# Patient Record
Sex: FEMALE | Race: BLACK OR AFRICAN AMERICAN | Employment: UNEMPLOYED | URBAN - METROPOLITAN AREA
[De-identification: names, ages, dates, MRNs, and addresses within clinical notes are randomized per-mention and may not be internally consistent; named-entity substitution may affect disease eponyms.]

---

## 2024-03-28 DIAGNOSIS — M79.671 RIGHT FOOT PAIN: Primary | ICD-10-CM

## 2024-04-01 ENCOUNTER — OFFICE VISIT (OUTPATIENT)
Dept: ORTHOPEDIC SURGERY | Age: 69
End: 2024-04-01
Payer: MEDICARE

## 2024-04-01 VITALS — WEIGHT: 183 LBS | RESPIRATION RATE: 17 BRPM | HEIGHT: 66 IN | OXYGEN SATURATION: 99 % | BODY MASS INDEX: 29.41 KG/M2

## 2024-04-01 DIAGNOSIS — M20.40 HAMMER TOE, ACQUIRED: Primary | ICD-10-CM

## 2024-04-01 DIAGNOSIS — M20.11 HALLUX VALGUS OF RIGHT FOOT: ICD-10-CM

## 2024-04-01 DIAGNOSIS — M62.461 GASTROCNEMIUS EQUINUS OF RIGHT LOWER EXTREMITY: ICD-10-CM

## 2024-04-01 DIAGNOSIS — L84 CALLUS OF FOOT: ICD-10-CM

## 2024-04-01 PROCEDURE — G8428 CUR MEDS NOT DOCUMENT: HCPCS | Performed by: ORTHOPAEDIC SURGERY

## 2024-04-01 PROCEDURE — 4004F PT TOBACCO SCREEN RCVD TLK: CPT | Performed by: ORTHOPAEDIC SURGERY

## 2024-04-01 PROCEDURE — 3017F COLORECTAL CA SCREEN DOC REV: CPT | Performed by: ORTHOPAEDIC SURGERY

## 2024-04-01 PROCEDURE — 1123F ACP DISCUSS/DSCN MKR DOCD: CPT | Performed by: ORTHOPAEDIC SURGERY

## 2024-04-01 PROCEDURE — G8400 PT W/DXA NO RESULTS DOC: HCPCS | Performed by: ORTHOPAEDIC SURGERY

## 2024-04-01 PROCEDURE — 1090F PRES/ABSN URINE INCON ASSESS: CPT | Performed by: ORTHOPAEDIC SURGERY

## 2024-04-01 PROCEDURE — 99204 OFFICE O/P NEW MOD 45 MIN: CPT | Performed by: ORTHOPAEDIC SURGERY

## 2024-04-01 PROCEDURE — G8419 CALC BMI OUT NRM PARAM NOF/U: HCPCS | Performed by: ORTHOPAEDIC SURGERY

## 2024-04-01 RX ORDER — TRAMADOL HYDROCHLORIDE 50 MG/1
TABLET ORAL
COMMUNITY
Start: 2024-01-24

## 2024-04-01 RX ORDER — LOSARTAN POTASSIUM 100 MG/1
100 TABLET ORAL DAILY
COMMUNITY

## 2024-04-01 RX ORDER — SIMVASTATIN 40 MG
40 TABLET ORAL NIGHTLY
COMMUNITY
Start: 2024-03-22

## 2024-04-01 RX ORDER — CLONIDINE HYDROCHLORIDE 0.1 MG/1
TABLET ORAL
COMMUNITY
Start: 2024-03-22

## 2024-04-01 NOTE — PROGRESS NOTES
[facility] PHYSICIANS [facility] ORTHOPEDICS AND SPORTS MEDICINE  [address] [id number]  Dept: [phone]    Ambulatory Orthopedic Consult      CHIEF COMPLAINT:    Chief Complaint   Patient presents with    Foot Pain     Right        HISTORY OF PRESENT ILLNESS:      The patient is a 68 y.o. female who is being seen for evaluation of the above, which began around 2/1/2024 atraumatically  . At today's visit, she is using no brace/assistive device.     History is obtained today from:   [x]  the patient     [x]  EMR     []  one family member/friend    []  multiple family members/friends    []  other:      At today's visit, the patient localizes pain to the right second toe.  The patient is here today with her .  The patient has previously seen a podiatrist for this problem.    REVIEW OF SYSTEMS:  Musculoskeletal: See HPI for pertinent positives     Past Medical History:    She  has no past medical history on file.     Past Surgical History:    She  has no past surgical history on file.     Current Medications:     Current Outpatient Medications:     simvastatin (ZOCOR) 40 MG tablet, Take 1 tablet by mouth nightly at bedtime., Disp: , Rfl:     cloNIDine (CATAPRES) 0.1 MG tablet, TAKE 1 TABLET BY MOUTH TWICE DAILY FOR 90 DAYS, Disp: , Rfl:     traMADol (ULTRAM) 50 MG tablet, TAKE 1/2 TABLET BY MOUTH TWICE A DAY AS NEEDED FOR 30 DAYS, Disp: , Rfl:     losartan (COZAAR) 100 MG tablet, Take 1 tablet by mouth daily, Disp: , Rfl:      Allergies:    Patient has no known allergies.    Family History:  family history is not on file.    Social History:   Social History     Occupational History    Not on file   Tobacco Use    Smoking status: Not on file    Smokeless tobacco: Not on file   Substance and Sexual Activity    Alcohol use: Not on file    Drug use: Not on file    Sexual activity: Not on file     Retired from clerical

## 2024-04-28 DIAGNOSIS — M20.11 HALLUX VALGUS OF RIGHT FOOT: ICD-10-CM

## 2024-04-28 DIAGNOSIS — M62.461 GASTROCNEMIUS EQUINUS OF RIGHT LOWER EXTREMITY: ICD-10-CM

## 2024-04-28 DIAGNOSIS — L84 CALLUS OF FOOT: ICD-10-CM

## 2024-04-28 DIAGNOSIS — M20.40 HAMMER TOE, ACQUIRED: ICD-10-CM

## 2024-04-29 DIAGNOSIS — M20.40 HAMMER TOE, ACQUIRED: ICD-10-CM

## 2024-04-29 DIAGNOSIS — L84 CALLUS OF FOOT: ICD-10-CM

## 2024-04-29 DIAGNOSIS — M62.461 GASTROCNEMIUS EQUINUS OF RIGHT LOWER EXTREMITY: ICD-10-CM

## 2024-04-29 DIAGNOSIS — M20.11 HALLUX VALGUS OF RIGHT FOOT: ICD-10-CM

## 2024-04-29 NOTE — TELEPHONE ENCOUNTER
Patient requesting refill on voltaren gel    Last OV: 4/1/24 Hammer toe, hallux valgus of right foot, gastroc equinus of right lower extremity   Next OV: PRN     Pharmacy verified by patient

## 2024-07-15 ENCOUNTER — OFFICE VISIT (OUTPATIENT)
Dept: PODIATRY | Age: 69
End: 2024-07-15
Payer: MEDICARE

## 2024-07-15 VITALS
BODY MASS INDEX: 28.51 KG/M2 | HEIGHT: 66 IN | HEART RATE: 87 BPM | WEIGHT: 177.4 LBS | SYSTOLIC BLOOD PRESSURE: 180 MMHG | DIASTOLIC BLOOD PRESSURE: 104 MMHG

## 2024-07-15 DIAGNOSIS — M20.41 HAMMER TOE OF RIGHT FOOT: ICD-10-CM

## 2024-07-15 DIAGNOSIS — M79.674 CHRONIC TOE PAIN, RIGHT FOOT: Primary | ICD-10-CM

## 2024-07-15 DIAGNOSIS — G89.29 CHRONIC TOE PAIN, RIGHT FOOT: Primary | ICD-10-CM

## 2024-07-15 PROCEDURE — 1123F ACP DISCUSS/DSCN MKR DOCD: CPT

## 2024-07-15 PROCEDURE — G8400 PT W/DXA NO RESULTS DOC: HCPCS

## 2024-07-15 PROCEDURE — G8419 CALC BMI OUT NRM PARAM NOF/U: HCPCS

## 2024-07-15 PROCEDURE — 3017F COLORECTAL CA SCREEN DOC REV: CPT

## 2024-07-15 PROCEDURE — 4004F PT TOBACCO SCREEN RCVD TLK: CPT

## 2024-07-15 PROCEDURE — 1090F PRES/ABSN URINE INCON ASSESS: CPT

## 2024-07-15 PROCEDURE — G8427 DOCREV CUR MEDS BY ELIG CLIN: HCPCS

## 2024-07-15 PROCEDURE — 99203 OFFICE O/P NEW LOW 30 MIN: CPT

## 2024-07-17 NOTE — PROGRESS NOTES
----- Message from Pita Mullins RN sent at 12/16/2019  8:49 AM CST -----    ----- Message -----  From: Awilda Duenas PA-C  Sent: 12/16/2019   8:31 AM CST  To: ERICA Duenas Nurse Msg Pool    Normal.    
Patient instructed to remove shoes and socks and instructed to sit in exam chair.  Current PCP is Dale Trejo MD and date of last visit was unknown.   Do you have a follow up visit scheduled?  No  If yes, the date is     
TABLET BY MOUTH TWICE A DAY AS NEEDED FOR 30 DAYS 1/24/24  Yes Provider, MD Jose       Objective     Vitals:    07/15/24 1439   BP: (!) 180/104   Pulse: 87       No results found for: \"LABA1C\"    Physical Exam:  General:  Alert and oriented x3. In no acute distress.     Lower Extremity Physical Exam:    Vascular: DP and PT pulses are palpable, Bilateral. CFT <3 seconds to all digits, Bilateral.  No edema, Bilateral.  Hair growth is present to the level of the digits, Bilateral.     Neuro: Saph/sural/SP/DP/plantar sensation intact to light touch. Protective sensation is intact to 10/10 sites as tested with a 5.07g SWMF, Bilateral.     Musculoskeletal: EHL/FHL/GS/TA gross motor intact. Tenderness to palpation of right second digit.  Gross deformity is flexion at the proximal interphalangeal joint with extension at the distal interphalangeal joint to the right second digit.  This is not corrected with simulated weightbearing across the metatarsal heads.    Dermatologic: No open lesions, Bilateral.  Interdigital maceration absent, Bilateral.  Nails 1-10 are normal.      Biomechanical Exam:    Right foot: 1st MTPJ: Loaded: 40 unloaded: 45  Right foot: 1st Ray: 5      Right foot: Ankle DF knee extended: 6  Right foot: Ankle DF knee flexed: 8    Left foot: 1st MTPJ: Loaded: 40 unloaded: 45  Left foot: 1st Ray: 5  Left foot: Ankle DF knee extended: 6  Left foot: Ankle DF knee flexed: 8    Gait Analysis:     Imaging: In chart    Assessment   Krystina Han is a 68 y.o. female with     Diagnosis Orders   1. Chronic toe pain, right foot        2. Hammer toe of right foot             Plan   A comprehensive history and physical examination were preformed. The patient was educated on clinical and radiographic findings, diagnosis and treatment plans. Patient state that she understands all that has been explained and all questions were answered to her apparent satisfaction.   Weightbearing x-ray taken in March 2024 reviewed

## 2024-07-22 ENCOUNTER — TELEPHONE (OUTPATIENT)
Dept: PODIATRY | Age: 69
End: 2024-07-22

## 2024-08-05 ENCOUNTER — HOSPITAL ENCOUNTER (OUTPATIENT)
Dept: PREADMISSION TESTING | Age: 69
Discharge: HOME | End: 2024-08-09
Payer: MEDICARE

## 2024-08-05 VITALS
WEIGHT: 174 LBS | HEART RATE: 73 BPM | SYSTOLIC BLOOD PRESSURE: 172 MMHG | RESPIRATION RATE: 16 BRPM | TEMPERATURE: 97.3 F | DIASTOLIC BLOOD PRESSURE: 90 MMHG | OXYGEN SATURATION: 97 % | HEIGHT: 66 IN | BODY MASS INDEX: 27.97 KG/M2

## 2024-08-05 LAB
ANION GAP SERPL CALCULATED.3IONS-SCNC: 10 MMOL/L (ref 9–17)
BASOPHILS # BLD: 0 K/UL (ref 0–0.2)
BASOPHILS NFR BLD: 0 % (ref 0–2)
BUN SERPL-MCNC: 23 MG/DL (ref 8–23)
CALCIUM SERPL-MCNC: 8.8 MG/DL (ref 8.6–10.4)
CHLORIDE SERPL-SCNC: 106 MMOL/L (ref 98–107)
CO2 SERPL-SCNC: 27 MMOL/L (ref 20–31)
CREAT SERPL-MCNC: 1.3 MG/DL (ref 0.5–0.9)
EKG ATRIAL RATE: 62 BPM
EKG P AXIS: 61 DEGREES
EKG P-R INTERVAL: 152 MS
EKG Q-T INTERVAL: 402 MS
EKG QRS DURATION: 76 MS
EKG QTC CALCULATION (BAZETT): 408 MS
EKG R AXIS: 7 DEGREES
EKG T AXIS: 42 DEGREES
EKG VENTRICULAR RATE: 62 BPM
EOSINOPHIL # BLD: 0.2 K/UL (ref 0–0.4)
EOSINOPHILS RELATIVE PERCENT: 4 % (ref 0–4)
ERYTHROCYTE [DISTWIDTH] IN BLOOD BY AUTOMATED COUNT: 13.3 % (ref 11.5–14.9)
GFR, ESTIMATED: 44 ML/MIN/1.73M2
GLUCOSE SERPL-MCNC: 95 MG/DL (ref 70–99)
HCT VFR BLD AUTO: 34.8 % (ref 36–46)
HGB BLD-MCNC: 11.2 G/DL (ref 12–16)
LYMPHOCYTES NFR BLD: 1.4 K/UL (ref 1–4.8)
LYMPHOCYTES RELATIVE PERCENT: 25 % (ref 24–44)
MCH RBC QN AUTO: 28.6 PG (ref 26–34)
MCHC RBC AUTO-ENTMCNC: 32.2 G/DL (ref 31–37)
MCV RBC AUTO: 89 FL (ref 80–100)
MONOCYTES NFR BLD: 0.4 K/UL (ref 0.1–1.3)
MONOCYTES NFR BLD: 7 % (ref 1–7)
NEUTROPHILS NFR BLD: 64 % (ref 36–66)
NEUTS SEG NFR BLD: 3.5 K/UL (ref 1.3–9.1)
PLATELET # BLD AUTO: 249 K/UL (ref 150–450)
PMV BLD AUTO: 8.1 FL (ref 6–12)
POTASSIUM SERPL-SCNC: 4.2 MMOL/L (ref 3.7–5.3)
RBC # BLD AUTO: 3.91 M/UL (ref 4–5.2)
SODIUM SERPL-SCNC: 143 MMOL/L (ref 135–144)
WBC OTHER # BLD: 5.5 K/UL (ref 3.5–11)

## 2024-08-05 PROCEDURE — 93005 ELECTROCARDIOGRAM TRACING: CPT | Performed by: ANESTHESIOLOGY

## 2024-08-05 PROCEDURE — 93010 ELECTROCARDIOGRAM REPORT: CPT | Performed by: INTERNAL MEDICINE

## 2024-08-05 PROCEDURE — APPSS45 APP SPLIT SHARED TIME 31-45 MINUTES: Performed by: NURSE PRACTITIONER

## 2024-08-05 PROCEDURE — 80048 BASIC METABOLIC PNL TOTAL CA: CPT

## 2024-08-05 PROCEDURE — 36415 COLL VENOUS BLD VENIPUNCTURE: CPT

## 2024-08-05 PROCEDURE — 85025 COMPLETE CBC W/AUTO DIFF WBC: CPT

## 2024-08-05 RX ORDER — VITAMIN E 268 MG
400 CAPSULE ORAL DAILY
COMMUNITY

## 2024-08-05 RX ORDER — LANOLIN ALCOHOL/MO/W.PET/CERES
1000 CREAM (GRAM) TOPICAL DAILY
COMMUNITY

## 2024-08-05 RX ORDER — ZINC GLUCONATE 50 MG
50 TABLET ORAL DAILY
COMMUNITY

## 2024-08-05 RX ORDER — ACETAMINOPHEN 160 MG
TABLET,DISINTEGRATING ORAL DAILY
COMMUNITY

## 2024-08-05 ASSESSMENT — PAIN DESCRIPTION - ORIENTATION: ORIENTATION: RIGHT

## 2024-08-05 ASSESSMENT — PAIN DESCRIPTION - LOCATION: LOCATION: FOOT

## 2024-08-05 ASSESSMENT — PAIN SCALES - GENERAL: PAINLEVEL_OUTOF10: 2

## 2024-08-05 ASSESSMENT — ENCOUNTER SYMPTOMS
ABDOMINAL PAIN: 0
SINUS PRESSURE: 0
SHORTNESS OF BREATH: 0
NAUSEA: 0

## 2024-08-05 ASSESSMENT — PAIN DESCRIPTION - DESCRIPTORS: DESCRIPTORS: ACHING

## 2024-08-05 ASSESSMENT — PAIN DESCRIPTION - PAIN TYPE: TYPE: ACUTE PAIN

## 2024-08-05 NOTE — H&P
HISTORY and PHYSICAL  [facility]       NAME:  Krystina Han  MRN: [id number]   YOB: 1955   Date: 8/5/2024   Age: 68 y.o.  Gender: female     COMPLAINT AND PRESENT HISTORY:     Krystina Han is 68 y.o., female, presents for pre-anesthesia/admission testing for Dx:  Hammer toe of right foot [M20.41]  With scheduled   SECOND DIGIT ARTHRODESIS RIGHT per Dr. Edwards    Office note per Yuliana Green DPM  on  7/15/24, italicized below.  HPI:  Krystina Han is a 68 y.o. year old female who presents to clinic today with right foot pain.  Patient was previously scheduled to undergo correction of hammertoe on right foot with Dr. Lund before was canceled.  Patient since was referred over here for treatment.  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  Patient has pain with ambulation, states that she has tried inserts and shoes without success.  Patient denies any current nausea, vomiting, fever, chills, shortness of breath.  Patient states that she does not smoke, has no prior significant medical history.  Additionally patient has tried Voltaren gel without success.  Primary care physician is Dale Trejo MD.    UPDATE  Patient complains of right foot pain with localizes pain to the right second toe. related to hammer toes.   Symptoms have progressed to a point and plateaued. Treatment thus far has included trial of new shoes, over-the-counter inserts, over-the-counter analgesics which all have not been effective.     The symptoms started 1 year ago.   Pt states that her regular podiatrist has regularly shaved down the bone on her affected toe.   Pt admits to pain more aggravated with walking with enclosed shoes.  Pt states that wearing sandals is better due to toes not touching anything.     Pt denies any recent falls or trauma. No redness, swelling or rashes.    Significant medical

## 2024-08-05 NOTE — DISCHARGE INSTRUCTIONS
Pre-op Instructions For Out-Patient Surgery    Medication Instructions:  Please stop herbs and any supplements now (includes vitamins and minerals).    Please contact your surgeon and prescribing physician for pre-op instructions for any blood thinners.    If you have inhalers/aerosol treatments at home, please use them the morning of your surgery and bring the inhalers with you to the hospital.    Please take the following medications the morning of your surgery with a sip of water:    clonidine     Surgery Instructions:  After midnight before surgery:  Do not eat or drink anything, including water, mints, gum, and hard candy.  You may brush your teeth without swallowing.  No smoking, chewing tobacco, or street drugs.    Please shower or bathe before surgery.  If you were given Surgical Scrub Chlorhexidine Gluconate Liquid (CHG), please shower the night before and the morning of your surgery following the detailed instructions you received during your pre-admission visit.     Please do not wear any cologne, lotion, powder, deodorant, jewelry, piercings, perfume, makeup, nail polish, hair accessories, or hair spray on the day of surgery.  Wear loose comfortable clothing.    Leave your valuables at home but bring a payment source for any after-surgery prescriptions you plan to fill at [facility].  Bring a storage case for any glasses/contacts.    An adult who is responsible for you MUST drive you home and should be with you for the first 24 hours after surgery.     If having out-patient knee and foot surgeries, please arrange for planned crutches, walker, or wheelchair before arriving to the hospital.    The Day of Surgery:  Arrive at [facility] Surgery Entrance at the time directed by your surgeon and check in at the desk.     If you have a living will or healthcare power of , please bring a copy.    You will be taken to the pre-op holding area where you

## 2024-08-05 NOTE — H&P (VIEW-ONLY)
HISTORY and PHYSICAL  [facility]       NAME:  Krystina Han  MRN: [id number]   YOB: 1955   Date: 8/5/2024   Age: 68 y.o.  Gender: female     COMPLAINT AND PRESENT HISTORY:     Krystina Han is 68 y.o., female, presents for pre-anesthesia/admission testing for Dx:  Hammer toe of right foot [M20.41]  With scheduled   SECOND DIGIT ARTHRODESIS RIGHT per Dr. Edwards    Office note per Yuliana Green DPM  on  7/15/24, italicized below.  HPI:  Krystina Han is a 68 y.o. year old female who presents to clinic today with right foot pain.  Patient was previously scheduled to undergo correction of hammertoe on right foot with Dr. Lund before was canceled.  Patient since was referred over here for treatment.  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  Patient has pain with ambulation, states that she has tried inserts and shoes without success.  Patient denies any current nausea, vomiting, fever, chills, shortness of breath.  Patient states that she does not smoke, has no prior significant medical history.  Additionally patient has tried Voltaren gel without success.  Primary care physician is Dael Trejo MD.    UPDATE  Patient complains of right foot pain with localizes pain to the right second toe. related to hammer toes.   Symptoms have progressed to a point and plateaued. Treatment thus far has included trial of new shoes, over-the-counter inserts, over-the-counter analgesics which all have not been effective.     The symptoms started 1 year ago.   Pt states that her regular podiatrist has regularly shaved down the bone on her affected toe.   Pt admits to pain more aggravated with walking with enclosed shoes.  Pt states that wearing sandals is better due to toes not touching anything.     Pt denies any recent falls or trauma. No redness, swelling or rashes.    Significant medical

## 2024-08-16 ENCOUNTER — ANESTHESIA EVENT (OUTPATIENT)
Dept: OPERATING ROOM | Age: 69
End: 2024-08-16
Payer: MEDICARE

## 2024-08-16 NOTE — PRE-PROCEDURE INSTRUCTIONS
Nothing to eat after midnight.   Are you taking any blood thinners? When was the last day?  Make sure to use Hibiclens prior to surgery.  Remove any jewelry and body piercings.  Do you wear glasses? If so, please bring a case to store them in.  Are you having any Covid symptoms?  Do you have any new rashes, infections, etc. that we should be aware of?  Do you have a ride home the day of surgery? It cannot be a cab or medical transportation.  Verify surgery time and what time to arrive at hospital.  NO ANSWER,  LEFT TO ARRIVE AT 0730

## 2024-08-19 ENCOUNTER — APPOINTMENT (OUTPATIENT)
Dept: GENERAL RADIOLOGY | Age: 69
End: 2024-08-19
Attending: PODIATRIST
Payer: MEDICARE

## 2024-08-19 ENCOUNTER — HOSPITAL ENCOUNTER (OUTPATIENT)
Age: 69
Setting detail: OUTPATIENT SURGERY
Discharge: HOME | End: 2024-08-19
Attending: PODIATRIST | Admitting: PODIATRIST
Payer: MEDICARE

## 2024-08-19 ENCOUNTER — ANESTHESIA (OUTPATIENT)
Dept: OPERATING ROOM | Age: 69
End: 2024-08-19
Payer: MEDICARE

## 2024-08-19 VITALS
BODY MASS INDEX: 27.97 KG/M2 | DIASTOLIC BLOOD PRESSURE: 54 MMHG | HEART RATE: 48 BPM | OXYGEN SATURATION: 100 % | WEIGHT: 174 LBS | HEIGHT: 66 IN | TEMPERATURE: 96.9 F | RESPIRATION RATE: 16 BRPM | SYSTOLIC BLOOD PRESSURE: 118 MMHG

## 2024-08-19 DIAGNOSIS — G89.18 POST-OP PAIN: Primary | ICD-10-CM

## 2024-08-19 DIAGNOSIS — M20.41 HAMMER TOE OF RIGHT FOOT: ICD-10-CM

## 2024-08-19 PROBLEM — M79.674 CHRONIC PAIN OF TOE OF RIGHT FOOT: Status: ACTIVE | Noted: 2024-08-19

## 2024-08-19 PROBLEM — D23.71 BENIGN NEOPLASM OF SKIN OF RIGHT FOOT: Status: ACTIVE | Noted: 2024-08-19

## 2024-08-19 PROBLEM — L60.0 ONYCHOCRYPTOSIS: Status: ACTIVE | Noted: 2024-08-19

## 2024-08-19 PROBLEM — G89.29 CHRONIC PAIN OF TOE OF RIGHT FOOT: Status: ACTIVE | Noted: 2024-08-19

## 2024-08-19 PROBLEM — M79.674 PAIN OF GREAT TOE, RIGHT: Status: ACTIVE | Noted: 2024-08-19

## 2024-08-19 PROCEDURE — 6360000002 HC RX W HCPCS: Performed by: PODIATRIST

## 2024-08-19 PROCEDURE — 6360000002 HC RX W HCPCS: Performed by: NURSE ANESTHETIST, CERTIFIED REGISTERED

## 2024-08-19 PROCEDURE — 73630 X-RAY EXAM OF FOOT: CPT

## 2024-08-19 PROCEDURE — 6370000000 HC RX 637 (ALT 250 FOR IP): Performed by: ANESTHESIOLOGY

## 2024-08-19 PROCEDURE — 28285 REPAIR OF HAMMERTOE: CPT | Performed by: PODIATRIST

## 2024-08-19 PROCEDURE — 3600000012 HC SURGERY LEVEL 2 ADDTL 15MIN: Performed by: PODIATRIST

## 2024-08-19 PROCEDURE — 6370000000 HC RX 637 (ALT 250 FOR IP): Performed by: PODIATRIST

## 2024-08-19 PROCEDURE — 7100000000 HC PACU RECOVERY - FIRST 15 MIN: Performed by: PODIATRIST

## 2024-08-19 PROCEDURE — 7100000030 HC ASPR PHASE II RECOVERY - FIRST 15 MIN: Performed by: PODIATRIST

## 2024-08-19 PROCEDURE — 11730 AVULSION NAIL PLATE SIMPLE 1: CPT | Performed by: PODIATRIST

## 2024-08-19 PROCEDURE — 2500000003 HC RX 250 WO HCPCS: Performed by: NURSE ANESTHETIST, CERTIFIED REGISTERED

## 2024-08-19 PROCEDURE — 88305 TISSUE EXAM BY PATHOLOGIST: CPT

## 2024-08-19 PROCEDURE — 3700000000 HC ANESTHESIA ATTENDED CARE: Performed by: PODIATRIST

## 2024-08-19 PROCEDURE — 3600000002 HC SURGERY LEVEL 2 BASE: Performed by: PODIATRIST

## 2024-08-19 PROCEDURE — 3700000001 HC ADD 15 MINUTES (ANESTHESIA): Performed by: PODIATRIST

## 2024-08-19 PROCEDURE — 7100000010 HC PHASE II RECOVERY - FIRST 15 MIN: Performed by: PODIATRIST

## 2024-08-19 PROCEDURE — 2500000003 HC RX 250 WO HCPCS: Performed by: PODIATRIST

## 2024-08-19 PROCEDURE — 7100000031 HC ASPR PHASE II RECOVERY - ADDTL 15 MIN: Performed by: PODIATRIST

## 2024-08-19 PROCEDURE — 7100000011 HC PHASE II RECOVERY - ADDTL 15 MIN: Performed by: PODIATRIST

## 2024-08-19 PROCEDURE — 7100000001 HC PACU RECOVERY - ADDTL 15 MIN: Performed by: PODIATRIST

## 2024-08-19 PROCEDURE — C1713 ANCHOR/SCREW BN/BN,TIS/BN: HCPCS | Performed by: PODIATRIST

## 2024-08-19 PROCEDURE — 2580000003 HC RX 258: Performed by: NURSE ANESTHETIST, CERTIFIED REGISTERED

## 2024-08-19 PROCEDURE — 2580000003 HC RX 258: Performed by: ANESTHESIOLOGY

## 2024-08-19 PROCEDURE — 2500000003 HC RX 250 WO HCPCS: Performed by: ANESTHESIOLOGY

## 2024-08-19 PROCEDURE — 11422 EXC H-F-NK-SP B9+MARG 1.1-2: CPT | Performed by: PODIATRIST

## 2024-08-19 PROCEDURE — 2709999900 HC NON-CHARGEABLE SUPPLY: Performed by: PODIATRIST

## 2024-08-19 DEVICE — K WIRE FIX L6IN DIA1.6MM ST S STL 3 SIDE DBL TRCR BOTH END: Type: IMPLANTABLE DEVICE | Site: FOOT | Status: FUNCTIONAL

## 2024-08-19 RX ORDER — SODIUM CHLORIDE 0.9 % (FLUSH) 0.9 %
5-40 SYRINGE (ML) INJECTION PRN
Status: DISCONTINUED | OUTPATIENT
Start: 2024-08-19 | End: 2024-08-19 | Stop reason: HOSPADM

## 2024-08-19 RX ORDER — ACETAMINOPHEN 500 MG
1000 TABLET ORAL ONCE
Status: COMPLETED | OUTPATIENT
Start: 2024-08-19 | End: 2024-08-19

## 2024-08-19 RX ORDER — GINSENG 100 MG
CAPSULE ORAL PRN
Status: DISCONTINUED | OUTPATIENT
Start: 2024-08-19 | End: 2024-08-19 | Stop reason: ALTCHOICE

## 2024-08-19 RX ORDER — CEFAZOLIN SODIUM 1 G/3ML
INJECTION, POWDER, FOR SOLUTION INTRAMUSCULAR; INTRAVENOUS PRN
Status: DISCONTINUED | OUTPATIENT
Start: 2024-08-19 | End: 2024-08-19 | Stop reason: SDUPTHER

## 2024-08-19 RX ORDER — SODIUM CHLORIDE 0.9 % (FLUSH) 0.9 %
5-40 SYRINGE (ML) INJECTION EVERY 12 HOURS SCHEDULED
Status: DISCONTINUED | OUTPATIENT
Start: 2024-08-19 | End: 2024-08-19 | Stop reason: HOSPADM

## 2024-08-19 RX ORDER — GABAPENTIN 100 MG/1
100 CAPSULE ORAL ONCE
Status: COMPLETED | OUTPATIENT
Start: 2024-08-19 | End: 2024-08-19

## 2024-08-19 RX ORDER — FENTANYL CITRATE 0.05 MG/ML
50 INJECTION, SOLUTION INTRAMUSCULAR; INTRAVENOUS EVERY 5 MIN PRN
Status: DISCONTINUED | OUTPATIENT
Start: 2024-08-19 | End: 2024-08-19 | Stop reason: HOSPADM

## 2024-08-19 RX ORDER — LIDOCAINE HYDROCHLORIDE 10 MG/ML
INJECTION, SOLUTION EPIDURAL; INFILTRATION; INTRACAUDAL; PERINEURAL PRN
Status: DISCONTINUED | OUTPATIENT
Start: 2024-08-19 | End: 2024-08-19 | Stop reason: ALTCHOICE

## 2024-08-19 RX ORDER — LIDOCAINE HYDROCHLORIDE 10 MG/ML
1 INJECTION, SOLUTION EPIDURAL; INFILTRATION; INTRACAUDAL; PERINEURAL
Status: COMPLETED | OUTPATIENT
Start: 2024-08-19 | End: 2024-08-19

## 2024-08-19 RX ORDER — SCOLOPAMINE TRANSDERMAL SYSTEM 1 MG/1
1 PATCH, EXTENDED RELEASE TRANSDERMAL ONCE
Status: DISCONTINUED | OUTPATIENT
Start: 2024-08-19 | End: 2024-08-19 | Stop reason: HOSPADM

## 2024-08-19 RX ORDER — SODIUM CHLORIDE 9 MG/ML
INJECTION, SOLUTION INTRAVENOUS PRN
Status: DISCONTINUED | OUTPATIENT
Start: 2024-08-19 | End: 2024-08-19 | Stop reason: HOSPADM

## 2024-08-19 RX ORDER — PROPOFOL 10 MG/ML
INJECTION, EMULSION INTRAVENOUS PRN
Status: DISCONTINUED | OUTPATIENT
Start: 2024-08-19 | End: 2024-08-19 | Stop reason: SDUPTHER

## 2024-08-19 RX ORDER — OXYCODONE HYDROCHLORIDE AND ACETAMINOPHEN 5; 325 MG/1; MG/1
1 TABLET ORAL EVERY 6 HOURS PRN
Qty: 28 TABLET | Refills: 0 | Status: SHIPPED | OUTPATIENT
Start: 2024-08-19 | End: 2024-08-26

## 2024-08-19 RX ORDER — BUPIVACAINE HYDROCHLORIDE 5 MG/ML
INJECTION, SOLUTION EPIDURAL; INTRACAUDAL PRN
Status: DISCONTINUED | OUTPATIENT
Start: 2024-08-19 | End: 2024-08-19 | Stop reason: ALTCHOICE

## 2024-08-19 RX ORDER — SODIUM CHLORIDE, SODIUM LACTATE, POTASSIUM CHLORIDE, CALCIUM CHLORIDE 600; 310; 30; 20 MG/100ML; MG/100ML; MG/100ML; MG/100ML
INJECTION, SOLUTION INTRAVENOUS CONTINUOUS PRN
Status: DISCONTINUED | OUTPATIENT
Start: 2024-08-19 | End: 2024-08-19 | Stop reason: SDUPTHER

## 2024-08-19 RX ORDER — SODIUM CHLORIDE, SODIUM LACTATE, POTASSIUM CHLORIDE, CALCIUM CHLORIDE 600; 310; 30; 20 MG/100ML; MG/100ML; MG/100ML; MG/100ML
INJECTION, SOLUTION INTRAVENOUS CONTINUOUS
Status: DISCONTINUED | OUTPATIENT
Start: 2024-08-19 | End: 2024-08-19 | Stop reason: HOSPADM

## 2024-08-19 RX ORDER — DIPHENHYDRAMINE HYDROCHLORIDE 50 MG/ML
12.5 INJECTION INTRAMUSCULAR; INTRAVENOUS
Status: DISCONTINUED | OUTPATIENT
Start: 2024-08-19 | End: 2024-08-19 | Stop reason: HOSPADM

## 2024-08-19 RX ORDER — ONDANSETRON 2 MG/ML
4 INJECTION INTRAMUSCULAR; INTRAVENOUS
Status: DISCONTINUED | OUTPATIENT
Start: 2024-08-19 | End: 2024-08-19 | Stop reason: HOSPADM

## 2024-08-19 RX ORDER — LIDOCAINE HYDROCHLORIDE 10 MG/ML
INJECTION, SOLUTION EPIDURAL; INFILTRATION; INTRACAUDAL; PERINEURAL PRN
Status: DISCONTINUED | OUTPATIENT
Start: 2024-08-19 | End: 2024-08-19 | Stop reason: SDUPTHER

## 2024-08-19 RX ORDER — ONDANSETRON 2 MG/ML
INJECTION INTRAMUSCULAR; INTRAVENOUS PRN
Status: DISCONTINUED | OUTPATIENT
Start: 2024-08-19 | End: 2024-08-19 | Stop reason: SDUPTHER

## 2024-08-19 RX ORDER — FENTANYL CITRATE 50 UG/ML
INJECTION, SOLUTION INTRAMUSCULAR; INTRAVENOUS PRN
Status: DISCONTINUED | OUTPATIENT
Start: 2024-08-19 | End: 2024-08-19 | Stop reason: SDUPTHER

## 2024-08-19 RX ADMIN — Medication 25 MCG: at 09:49

## 2024-08-19 RX ADMIN — SODIUM CHLORIDE, POTASSIUM CHLORIDE, SODIUM LACTATE AND CALCIUM CHLORIDE: 600; 310; 30; 20 INJECTION, SOLUTION INTRAVENOUS at 08:40

## 2024-08-19 RX ADMIN — GABAPENTIN 100 MG: 100 CAPSULE ORAL at 08:33

## 2024-08-19 RX ADMIN — LIDOCAINE HYDROCHLORIDE 50 MG: 10 INJECTION, SOLUTION EPIDURAL; INFILTRATION; INTRACAUDAL; PERINEURAL at 09:49

## 2024-08-19 RX ADMIN — CEFAZOLIN 2 G: 1 INJECTION, POWDER, FOR SOLUTION INTRAMUSCULAR; INTRAVENOUS at 09:56

## 2024-08-19 RX ADMIN — PROPOFOL 75 MCG/KG/MIN: 10 INJECTION, EMULSION INTRAVENOUS at 09:50

## 2024-08-19 RX ADMIN — SODIUM CHLORIDE, POTASSIUM CHLORIDE, SODIUM LACTATE AND CALCIUM CHLORIDE: 600; 310; 30; 20 INJECTION, SOLUTION INTRAVENOUS at 09:45

## 2024-08-19 RX ADMIN — ONDANSETRON 4 MG: 2 INJECTION INTRAMUSCULAR; INTRAVENOUS at 10:06

## 2024-08-19 RX ADMIN — PROPOFOL 50 MG: 10 INJECTION, EMULSION INTRAVENOUS at 09:49

## 2024-08-19 RX ADMIN — LIDOCAINE HYDROCHLORIDE 1 ML: 10 INJECTION, SOLUTION EPIDURAL; INFILTRATION; INTRACAUDAL; PERINEURAL at 08:40

## 2024-08-19 RX ADMIN — ACETAMINOPHEN 1000 MG: 500 TABLET ORAL at 08:33

## 2024-08-19 ASSESSMENT — PAIN - FUNCTIONAL ASSESSMENT
PAIN_FUNCTIONAL_ASSESSMENT: NONE - DENIES PAIN
PAIN_FUNCTIONAL_ASSESSMENT: 0-10
PAIN_FUNCTIONAL_ASSESSMENT: NONE - DENIES PAIN

## 2024-08-19 ASSESSMENT — PAIN SCALES - GENERAL: PAINLEVEL_OUTOF10: 0

## 2024-08-19 NOTE — ANESTHESIA POSTPROCEDURE EVALUATION
Department of Anesthesiology  Postprocedure Note    Patient: Krystina Han  MRN: [id number]  YOB: 1955  Date of evaluation: 8/19/2024    Procedure Summary       Date: 08/19/24 Room / Location: [address]    Anesthesia Start: 0945 Anesthesia Stop: 1048    Procedures:       SECOND DIGIT ARTHRODESIS RIGHT TOE (Right: Second Toe)      NAILBED EXCISION MATRIXECTOMY - REMOVAL OF TOENAIL GREAT TOE RIGHT FOOT (Right: First Toe) Diagnosis:       Hammer toe of right foot      (Hammer toe of right foot [M20.41])    Surgeons: Cassie Edwards DPM Responsible Provider: Margaux Malone MD    Anesthesia Type: general ASA Status: 3            Anesthesia Type: No value filed.    Saurabh Phase I: Saurabh Score: 10    Saurabh Phase II: Saurabh Score: 10    Anesthesia Post Evaluation    Comments: POST- ANESTHESIA EVALUATION       Pt Name: Krystina Han  MRN: [id number]  YOB: 1955  Date of evaluation: 8/19/2024  Time:  1:43 PM      BP (!) 118/54   Pulse (!) 48   Temp 96.9 °F (36.1 °C) (Temporal)   Resp 16   Ht 1.676 m (5' 6\")   Wt 78.9 kg (174 lb)   SpO2 100%   BMI 28.08 kg/m²      Consciousness Level  Awake  Cardiopulmonary Status  Stable  Pain Adequately Treated YES  Nausea / Vomiting  NO  Adequate Hydration  YES  Anesthesia Related Complications NONE      Electronically signed by Margaux Malone MD on 8/19/2024 at 1:43 PM           No notable events documented.

## 2024-08-19 NOTE — INTERVAL H&P NOTE
Update History & Physical    The patient's History and Physical of   August 5, 2024    Patient will be having : Procedure(s):  SECOND DIGIT ARTHRODESIS RIGHT TOE      There was a change, she reports that she stubbed her great toe on the right couple days after PAT she states aggravated pain..      Patient did obtain Medical  clearance.     Patient has been NPO since midnight. No blood thinners in the past 5-7 days.     Patient took   clonidine  this am with sip of water.     Patient has hx of PONV and prolonged emergence with general anesthesia.       Patient was physically assessed, including cardiovascular and respiratory.     Patient understands and wants to proceed with the procedure.     Electronically signed by PIERRE RODRIGUEZ CNP on 8/19/2024 at 7:35 AM    Note marked for cosign by provider

## 2024-08-19 NOTE — DISCHARGE INSTRUCTIONS
Podiatric Post Operative Instructions:  You have had a surgical procedure on your right foot.      Fluids and Diet:  Begin with clear liquids, broth, dry toast, and crackers.  If not nauseated then resume your regular pre-operative diet when you are ready    Medications:  Take your prescriptions as directed  You are receiving new prescriptions for Percocet  If your pain is not severe then you may take the non-prescription medication that you normally take for aches and pains ie Tylenol and Ibuprofen (alternating), or if severe pain occurs these will serve as additional medication in conjuction with the Percocet  You may resume your regularly scheduled medications (unless otherwise directed)  If any side effects or adverse reactions occur, discontinue the medication and contact your doctor.  Review the patient drug information that is provided before you take any medication    Ambulation and Activity:  You are advised to go directly home from the hospital  Use crutches or scooter as needed  We recommend knee scooter if possible, you can also obtain these on [employer] for rather affordable and quickly obtainable.  You may not put weight on the operated foot.  You should wear the surgical shoe at all times when awake. Do not walk on the right foot.  Avoid stairs.  Do not lift or move heavy objects  Do not drive until cleared by your physician    Bandage and Wound Care Instructions:  Keep bandage clean and dry  Do NOT remove dressing/ splint  DO NOT get wet  Please use shower cover around leg if you do shower so that dressing does not get wet  Do not shower or bathe the operative extremity  Do not remove the bandage (unless otherwise directed)   Do not attempt to put anything between the cast or dressing and your skin, some itching is normal.    Ice and Elevation:  Elevate operative extremity as much as possible to reduce swelling and discomfort.  Elevate with 2 pillows at or above the level of the heart for the first

## 2024-08-19 NOTE — BRIEF OP NOTE
PODIATRY BRIEF OP NOTE    PATIENT NAME: Krystina Han  YOB: 1955  -  68 y.o. female  MRN: [id number]  DATE: 8/19/2024  BILLING #: [id number]    Surgeon(s):  Cassie Edwards DPM     ASSISTANTS: Jody Rodriguez DPM PGY2; Alka Jaquez DPM PGY1    PRE-OP DIAGNOSIS:   Hammer toe 2nd digit, right foot  Soft tissue mass, right foot  Incurvated nail, right foot  Onychomycosis of hallux, right foot  Foot pain, right    POST-OP DIAGNOSIS: Same as above.    PROCEDURE:   Hammer toe correction of 2nd digit, right foot  Excision of tissue mass of 2nd digit, right foot  Total nail avulsion of hallux, right foot    ANESTHESIA: MAC    HEMOSTASIS: Pneumatic tourniquet to right ankle @ 250 mmHg for 36 minutes.    ESTIMATED BLOOD LOSS: Less than 5 cc.    MATERIALS:   Implant Name Type Inv. Item Serial No.  Lot No. LRB No. Used Action   K WIRE FIX L6IN DIA1.6MM ST S STL 3 SIDE DBL TRCR BOTH END - [id number]  K WIRE FIX L6IN DIA1.6MM ST S STL 3 SIDE DBL TRCR BOTH END  [employer] [facility]  Right 1 Implanted     INJECTABLES:   Pre-op: 10 cc mix of 1% lidocaine plain and 0.5% marcaine plain  Post op: 10 cc 0.5% marcaine plain    SPECIMEN:   ID Type Source Tests Collected by Time Destination   A : LESION RIGHT SECOND TOE Tissue Toe SURGICAL PATHOLOGY Cassie Edwards DPM 8/19/2024 1005      COMPLICATIONS: None    FINDINGS: Soft tissue mass noted to right dorsal 2nd digit and hammertoe deformity. Incurvated, thickened and discolored hallux nail. Dressings: adaptic, 4x4s, kerlix, ace. Hallux avulsion site antibiotic ointment.     Jody Rodriguez DPM   Podiatric Medicine & Surgery   8/19/2024 at 10:47 AM

## 2024-08-19 NOTE — ANESTHESIA PRE PROCEDURE
intravenous.    MIPS: Postoperative opioids intended and Prophylactic antiemetics administered.  Anesthetic plan and risks discussed with patient.      Plan discussed with CRNA.                Margaux Malone MD   8/19/2024

## 2024-08-19 NOTE — OP NOTE
Operative Note      Patient: Krystina Han  YOB: 1955  MRN: [id number]    Date of Procedure: 8/19/2024    Pre-Op Diagnosis Codes:   Hammer toe 2nd digit, right foot, pain right 2nd toe  Benign neoplasm of skin, right foot  Incurvated nail, right foot  Onychomycosis of hallux, right foot  Foot pain, right    Post-Op Diagnosis: Same       Procedure(s):  Hammer toe correction of 2nd digit, right foot  Excision of benign skin lesion, 1.3 cm, of 2nd digit, right foot  Total nail avulsion of hallux, right foot    Surgeon(s):  Cassie Edwards DPM    Assistant: Jody Rodriguez DPM PGY2; Alka Jaquez DPM PGY1     Anesthesia: MAC    Injectables:   Pre-op: 10 cc mix of 1% lidocaine plain and 0.5% marcaine plain  Post-op: 10 cc 0.5% marcaine plain    Hemostasis: Pneumatic tourniquet to right ankle @ 250 mmHg for 36 minutes    Estimated Blood Loss (mL): less than 5    Complications: None    Specimens:   ID Type Source Tests Collected by Time Destination   A : LESION RIGHT SECOND TOE Tissue Toe SURGICAL PATHOLOGY Cassie Edwards DPM 8/19/2024 1005        Implants:  Implant Name Type Inv. Item Serial No.  Lot No. LRB No. Used Action   K WIRE FIX L6IN DIA1.6MM ST S STL 3 SIDE DBL TRCR BOTH END - [id number]  K WIRE FIX L6IN DIA1.6MM ST S STL 3 SIDE DBL TRCR BOTH END  [facility]  Right 1 Implanted         Drains: * No LDAs found *    Findings:  Infection Present At Time Of Surgery (PATOS) (choose all levels that have infection present):  No infection present  Other Findings:  Soft tissue mass noted to right dorsal 2nd digit and hammertoe deformity. Incurvated, thickened and discolored hallux nail.        INDICATION FOR PROCEDURE: The patient has had a painful contracted right 2nd hammer toe deformity, 2nd digit soft tissue mass dorsally, and a painful incurvated nail to right great toes which have recurred and failed conservative treatments. Patient elected to undergo surgery.

## 2024-08-20 DIAGNOSIS — D23.71 BENIGN NEOPLASM OF SKIN OF RIGHT FOOT: ICD-10-CM

## 2024-08-20 DIAGNOSIS — G89.18 POST-OP PAIN: ICD-10-CM

## 2024-08-20 DIAGNOSIS — M20.41 HAMMER TOE OF RIGHT FOOT: Primary | ICD-10-CM

## 2024-08-20 LAB — SURGICAL PATHOLOGY REPORT: NORMAL

## 2024-08-20 RX ORDER — OXYCODONE HYDROCHLORIDE AND ACETAMINOPHEN 5; 325 MG/1; MG/1
1 TABLET ORAL EVERY 6 HOURS PRN
Qty: 28 TABLET | Refills: 0 | Status: SHIPPED | OUTPATIENT
Start: 2024-08-20 | End: 2024-08-27

## 2024-08-22 ENCOUNTER — TELEPHONE (OUTPATIENT)
Dept: PODIATRY | Age: 69
End: 2024-08-22

## 2024-08-22 NOTE — TELEPHONE ENCOUNTER
LVM for pt to call back office. I informed her the order for knee scooter has been put in but we just need to know where she would like it faxed to.

## 2024-08-26 ENCOUNTER — APPOINTMENT (OUTPATIENT)
Dept: GENERAL RADIOLOGY | Age: 69
End: 2024-08-26
Payer: MEDICARE

## 2024-08-26 ENCOUNTER — OFFICE VISIT (OUTPATIENT)
Dept: PODIATRY | Age: 69
End: 2024-08-26

## 2024-08-26 ENCOUNTER — HOSPITAL ENCOUNTER (OUTPATIENT)
Age: 69
Setting detail: OBSERVATION
Discharge: HOME | End: 2024-08-28
Attending: EMERGENCY MEDICINE | Admitting: EMERGENCY MEDICINE
Payer: MEDICARE

## 2024-08-26 VITALS
HEIGHT: 66 IN | SYSTOLIC BLOOD PRESSURE: 158 MMHG | HEART RATE: 70 BPM | DIASTOLIC BLOOD PRESSURE: 82 MMHG | BODY MASS INDEX: 27.97 KG/M2 | WEIGHT: 174 LBS

## 2024-08-26 DIAGNOSIS — Z98.890 POST-OPERATIVE STATE: Primary | ICD-10-CM

## 2024-08-26 DIAGNOSIS — D23.71 BENIGN NEOPLASM OF SKIN OF RIGHT FOOT: ICD-10-CM

## 2024-08-26 DIAGNOSIS — R55 SYNCOPE AND COLLAPSE: Primary | ICD-10-CM

## 2024-08-26 DIAGNOSIS — I95.0 IDIOPATHIC HYPOTENSION: ICD-10-CM

## 2024-08-26 DIAGNOSIS — M20.41 HAMMER TOE OF RIGHT FOOT: ICD-10-CM

## 2024-08-26 DIAGNOSIS — R42 DIZZINESS: ICD-10-CM

## 2024-08-26 DIAGNOSIS — G89.29 CHRONIC PAIN OF TOE OF RIGHT FOOT: ICD-10-CM

## 2024-08-26 DIAGNOSIS — M79.674 CHRONIC PAIN OF TOE OF RIGHT FOOT: ICD-10-CM

## 2024-08-26 DIAGNOSIS — L60.0 ONYCHOCRYPTOSIS: ICD-10-CM

## 2024-08-26 DIAGNOSIS — R55 SYNCOPE, NEAR: ICD-10-CM

## 2024-08-26 DIAGNOSIS — M79.674 PAIN OF GREAT TOE, RIGHT: ICD-10-CM

## 2024-08-26 LAB
ANION GAP SERPL CALCULATED.3IONS-SCNC: 12 MMOL/L (ref 9–16)
BASOPHILS # BLD: 0.03 K/UL (ref 0–0.2)
BASOPHILS NFR BLD: 0 % (ref 0–2)
BUN SERPL-MCNC: 18 MG/DL (ref 8–23)
CALCIUM SERPL-MCNC: 9.3 MG/DL (ref 8.6–10.4)
CHLORIDE SERPL-SCNC: 102 MMOL/L (ref 98–107)
CO2 SERPL-SCNC: 26 MMOL/L (ref 20–31)
CREAT SERPL-MCNC: 1.3 MG/DL (ref 0.5–0.9)
EOSINOPHIL # BLD: 0.16 K/UL (ref 0–0.44)
EOSINOPHILS RELATIVE PERCENT: 2 % (ref 1–4)
ERYTHROCYTE [DISTWIDTH] IN BLOOD BY AUTOMATED COUNT: 12.5 % (ref 11.8–14.4)
GFR, ESTIMATED: 43 ML/MIN/1.73M2
GLUCOSE SERPL-MCNC: 119 MG/DL (ref 74–99)
HCT VFR BLD AUTO: 35.7 % (ref 36.3–47.1)
HGB BLD-MCNC: 12 G/DL (ref 11.9–15.1)
IMM GRANULOCYTES # BLD AUTO: 0.03 K/UL (ref 0–0.3)
IMM GRANULOCYTES NFR BLD: 0 %
LYMPHOCYTES NFR BLD: 2.31 K/UL (ref 1.1–3.7)
LYMPHOCYTES RELATIVE PERCENT: 29 % (ref 24–43)
MCH RBC QN AUTO: 29.4 PG (ref 25.2–33.5)
MCHC RBC AUTO-ENTMCNC: 33.6 G/DL (ref 28.4–34.8)
MCV RBC AUTO: 87.5 FL (ref 82.6–102.9)
MONOCYTES NFR BLD: 0.54 K/UL (ref 0.1–1.2)
MONOCYTES NFR BLD: 7 % (ref 3–12)
NEUTROPHILS NFR BLD: 62 % (ref 36–65)
NEUTS SEG NFR BLD: 4.8 K/UL (ref 1.5–8.1)
NRBC BLD-RTO: 0 PER 100 WBC
PLATELET # BLD AUTO: 269 K/UL (ref 138–453)
PMV BLD AUTO: 9.9 FL (ref 8.1–13.5)
POTASSIUM SERPL-SCNC: 3.4 MMOL/L (ref 3.7–5.3)
RBC # BLD AUTO: 4.08 M/UL (ref 3.95–5.11)
SODIUM SERPL-SCNC: 140 MMOL/L (ref 136–145)
TROPONIN I SERPL HS-MCNC: 8 NG/L (ref 0–14)
TROPONIN I SERPL HS-MCNC: 9 NG/L (ref 0–14)
WBC OTHER # BLD: 7.9 K/UL (ref 3.5–11.3)

## 2024-08-26 PROCEDURE — 93005 ELECTROCARDIOGRAM TRACING: CPT

## 2024-08-26 PROCEDURE — 71046 X-RAY EXAM CHEST 2 VIEWS: CPT

## 2024-08-26 PROCEDURE — 2580000003 HC RX 258

## 2024-08-26 PROCEDURE — 99285 EMERGENCY DEPT VISIT HI MDM: CPT

## 2024-08-26 PROCEDURE — 96360 HYDRATION IV INFUSION INIT: CPT

## 2024-08-26 PROCEDURE — G0378 HOSPITAL OBSERVATION PER HR: HCPCS

## 2024-08-26 PROCEDURE — 96361 HYDRATE IV INFUSION ADD-ON: CPT

## 2024-08-26 PROCEDURE — 99024 POSTOP FOLLOW-UP VISIT: CPT

## 2024-08-26 PROCEDURE — 6370000000 HC RX 637 (ALT 250 FOR IP)

## 2024-08-26 PROCEDURE — 80048 BASIC METABOLIC PNL TOTAL CA: CPT

## 2024-08-26 PROCEDURE — 85025 COMPLETE CBC W/AUTO DIFF WBC: CPT

## 2024-08-26 PROCEDURE — 84484 ASSAY OF TROPONIN QUANT: CPT

## 2024-08-26 RX ORDER — LOSARTAN POTASSIUM 50 MG/1
100 TABLET ORAL DAILY
Status: DISCONTINUED | OUTPATIENT
Start: 2024-08-27 | End: 2024-08-27

## 2024-08-26 RX ORDER — SODIUM CHLORIDE 0.9 % (FLUSH) 0.9 %
5-40 SYRINGE (ML) INJECTION EVERY 12 HOURS SCHEDULED
Status: DISCONTINUED | OUTPATIENT
Start: 2024-08-26 | End: 2024-08-29 | Stop reason: HOSPADM

## 2024-08-26 RX ORDER — CLONIDINE HYDROCHLORIDE 0.1 MG/1
0.1 TABLET ORAL ONCE
Status: COMPLETED | OUTPATIENT
Start: 2024-08-27 | End: 2024-08-27

## 2024-08-26 RX ORDER — SODIUM CHLORIDE 9 MG/ML
INJECTION, SOLUTION INTRAVENOUS PRN
Status: DISCONTINUED | OUTPATIENT
Start: 2024-08-26 | End: 2024-08-29 | Stop reason: HOSPADM

## 2024-08-26 RX ORDER — ONDANSETRON 4 MG/1
4 TABLET, ORALLY DISINTEGRATING ORAL EVERY 8 HOURS PRN
Status: DISCONTINUED | OUTPATIENT
Start: 2024-08-26 | End: 2024-08-29 | Stop reason: HOSPADM

## 2024-08-26 RX ORDER — 0.9 % SODIUM CHLORIDE 0.9 %
1000 INTRAVENOUS SOLUTION INTRAVENOUS ONCE
Status: COMPLETED | OUTPATIENT
Start: 2024-08-26 | End: 2024-08-26

## 2024-08-26 RX ORDER — ONDANSETRON 2 MG/ML
4 INJECTION INTRAMUSCULAR; INTRAVENOUS EVERY 6 HOURS PRN
Status: DISCONTINUED | OUTPATIENT
Start: 2024-08-26 | End: 2024-08-29 | Stop reason: HOSPADM

## 2024-08-26 RX ORDER — CLONIDINE HYDROCHLORIDE 0.1 MG/1
0.1 TABLET ORAL ONCE
Status: COMPLETED | OUTPATIENT
Start: 2024-08-26 | End: 2024-08-26

## 2024-08-26 RX ORDER — ATORVASTATIN CALCIUM 40 MG/1
40 TABLET, FILM COATED ORAL NIGHTLY
Status: DISCONTINUED | OUTPATIENT
Start: 2024-08-26 | End: 2024-08-29 | Stop reason: HOSPADM

## 2024-08-26 RX ORDER — SODIUM CHLORIDE 0.9 % (FLUSH) 0.9 %
5-40 SYRINGE (ML) INJECTION PRN
Status: DISCONTINUED | OUTPATIENT
Start: 2024-08-26 | End: 2024-08-29 | Stop reason: HOSPADM

## 2024-08-26 RX ORDER — ACETAMINOPHEN 325 MG/1
650 TABLET ORAL EVERY 4 HOURS PRN
Status: DISCONTINUED | OUTPATIENT
Start: 2024-08-26 | End: 2024-08-29 | Stop reason: HOSPADM

## 2024-08-26 RX ADMIN — CLONIDINE HYDROCHLORIDE 0.1 MG: 0.1 TABLET ORAL at 21:15

## 2024-08-26 RX ADMIN — SODIUM CHLORIDE, PRESERVATIVE FREE 5 ML: 5 INJECTION INTRAVENOUS at 21:18

## 2024-08-26 RX ADMIN — ATORVASTATIN CALCIUM 40 MG: 80 TABLET, FILM COATED ORAL at 21:04

## 2024-08-26 RX ADMIN — SODIUM CHLORIDE 1000 ML: 9 INJECTION, SOLUTION INTRAVENOUS at 14:50

## 2024-08-26 ASSESSMENT — ENCOUNTER SYMPTOMS
BACK PAIN: 0
DIARRHEA: 0
NAUSEA: 0
ABDOMINAL PAIN: 0
SHORTNESS OF BREATH: 0
COUGH: 0
VOMITING: 0

## 2024-08-26 NOTE — PROGRESS NOTES
[facility]  [address]   [address] [place] [address]  Tel: [phone]   Fax: [phone]    Subjective     CC: Right foot pain    Interval history:  Patient was seen in clinic.  Patient reports feeling faint, was unable to move extremities.  Neurologically the patient was able to respond to stimuli, no report of chest pain.  Blood pressure taken upon entry was 158/82, upon recheck was 78/60.  Rapid response was called in clinic.  Per the patient's  this happened once prior on Thursday.  Patient did report that she was taking her blood pressure medication, possibly took it late today.    HPI:  Krystina Han is a 68 y.o. year old female who presents to clinic today with right foot pain.  Patient was previously scheduled to undergo correction of hammertoe on right foot with Dr. Lund before was canceled.  Patient since was referred over here for treatment.  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  Patient has pain with ambulation, states that she has tried inserts and shoes without success.  Patient denies any current nausea, vomiting, fever, chills, shortness of breath.  Patient states that she does not smoke, has no prior significant medical history.  Additionally patient has tried Voltaren gel without success.  Primary care physician is Dale Trejo MD.    ROS:    Constitutional: Denies nausea, vomiting, fever, chills.  Neurologic: Denies numbness, tingling, and burning in the feet.    Vascular: Denies symptoms of lower extremity claudication.    Skin: Denies open wounds.  Otherwise negative except as noted in the HPI.     PMH:  Past Medical History:   Diagnosis Date    Head injury 2017    fell in bathroom    Hearing loss     left    Hypertension     PONV (postoperative nausea and vomiting)     Prolonged emergence from general anesthesia     Shingles 09/03/2021       Surgical History:    Past Surgical History:   Procedure Laterality Date    BREAST BIOPSY Left 2013    atypical ductal hyperplasia    BREAST LUMPECTOMY Left 2013    CHOLECYSTECTOMY      COLONOSCOPY      EYE SURGERY Bilateral 05/25/2021    cataracts    FOOT SURGERY Right 8/19/2024    SECOND DIGIT ARTHRODESIS RIGHT TOE performed by Cassie Edwards DPM at [facility] OR    HYSTERECTOMY (CERVIX STATUS UNKNOWN)      with oophorectomy at age 40    NAIL SURGERY Right 8/19/2024    NAILBED EXCISION MATRIXECTOMY - REMOVAL OF TOENAIL GREAT TOE RIGHT FOOT performed by Cassie Edwards DPM at [facility] OR    WISDOM TOOTH EXTRACTION         Social History:  Social History     Tobacco Use    Smoking status: Never    Smokeless tobacco: Never   Vaping Use    Vaping status: Never Used   Substance Use Topics    Alcohol use: Not Currently    Drug use: Not Currently       Medications:  Prior to Admission medications    Medication Sig Start Date End Date Taking? Authorizing Provider   oxyCODONE-acetaminophen (PERCOCET) 5-325 MG per tablet Take 1 tablet by mouth every 6 hours as needed for Pain for up to 7 days. Intended supply: 7 days. Take lowest dose possible to manage pain Max Daily Amount: 4 tablets 8/20/24 8/27/24  Cassie Edwards DPM   oxyCODONE-acetaminophen (PERCOCET) 5-325 MG per tablet Take 1 tablet by mouth every 6 hours as needed for Pain for up to 7 days. Intended supply: 7 days. Take lowest dose possible to manage pain Max Daily Amount: 4 tablets 8/19/24 8/26/24  Jody Justin DPM   vitamin B-12 (CYANOCOBALAMIN) 1000 MCG tablet Take 1 tablet by mouth daily    Jose Justin MD   Cholecalciferol (VITAMIN D3) 50 MCG (2000 UT) CAPS Take by mouth daily    Jose Justin MD   vitamin E 400 UNIT capsule Take 1 capsule by mouth daily    Jose Justin MD   zinc gluconate 50 MG tablet Take 1 tablet by mouth daily gummies    Jose Justin MD   simvastatin (ZOCOR) 40 MG tablet Take 1 tablet by mouth nightly at

## 2024-08-26 NOTE — ED PROVIDER NOTES
[facility]  Emergency Department  Faculty Attestation     I performed a history and physical examination of the patient and discussed management with the resident. I reviewed the resident’s note and agree with the documented findings and plan of care. Any areas of disagreement are noted on the chart. I was personally present for the key portions of any procedures. I have documented in the chart those procedures where I was not present during the key portions. I have reviewed the emergency nurses triage note. I agree with the chief complaint, past medical history, past surgical history, allergies, medications, social and family history as documented unless otherwise noted below.    For Physician Assistant/ Nurse Practitioner cases/documentation I have personally evaluated this patient and have completed at least one if not all key elements of the E/M (history, physical exam, and MDM). Additional findings are as noted.    Preliminary note started at 2:29 PM EDT    Primary Care Physician:  Dale Trejo MD    Screenings:  [unfilled]    CHIEF COMPLAINT       Chief Complaint   Patient presents with    Loss of Consciousness       RECENT VITALS:   BP 91/79   Pulse 62   Temp 98 °F (36.7 °C) (Oral)   Resp 14   SpO2 96%     LABS:  Labs Reviewed - No data to display    Radiology  No orders to display       CRITICAL CARE: There was a high probability of clinically significant/life threatening deterioration in this patient's condition which required my urgent intervention.  Total critical care time was none minutes.  This excludes any time for separately reportable procedures.     EKG:  EKG Interpretation    Interpreted by me    Rhythm: normal sinus   Rate: Bradycardia  Axis: normal  Ectopy: none  Conduction: normal  ST Segments: no acute change  T Waves: Inversion V2, V3  Q Waves: none    Clinical Impression: Sinus bradycardia, nonspecific T wave change    Attending Physician

## 2024-08-26 NOTE — ED TRIAGE NOTES
[facility] - Observation [address] [place] [address] Medicine  Progress Note    Patient Name: Neva Severino  MRN: [id number]  Patient Class: OP- Observation   Admission Date: 12/21/2022  Length of Stay: 0 days  Attending Physician: Carla Restrepo MD  Primary Care Provider: Yohana Bowers DO        Subjective:     Principal Problem:<principal problem not specified>        HPI:  Neva Severino is a 49 y.o. female with HTN, morbid obesity, and anxiety, who presents to the ED with shortness of breath and chest tightness that has been gradually worsening over the past couple of days. She reports her symptoms initially started ~1 mo ago and she was hospitalized ([facility]) with diagnosis of pna and was referred to pulm clinic. Her first appt is in early January. She was given an albuterol inhaler which helps, but it did not relieve her symptoms today. The symptoms begin when she wakes up and worsen with minimal exertion. She reports she has been less active because it is difficult even to move around the house at times; she easily becomes fatigued of SOB. She had a bad cough before her Nov admission, but today she reports she only coughs occasionally.  She denies fevers or chills, She endorses weight gain over the past month or so due to inactivity. She is being worked up in bariatric clinic for possible surgery. She has chronic ankle swelling and she was started on lasix about a year ago. She feels the lasix never worked that well for her, but she remains compliant. Denies significant change in swelling, denies pitting edema. When discussing her CP she feels it comes on when she wakes up and cant breathe, but she thinks it has a lot to do with her anxiety. She is not on psychiatric meds for anxiety at this time, but she discusses this with her PCP.     On presentation to the ED, T 98 F, , RR 22, /117, stable on room air. HR now remains elevated , but vitals otherwise improved and stable. Labs grossly  Pt presents to Ed pt was at podiatrist office, pt had blurry vision, pt felt like she was going to pass out, was brought here to Ed from office. Pt states she was just sitting and not doing anything when she felt like she was going to pass out. Pt states she felt like everything was going dark. Pt states she just started Nifedipine about a week ago.   Pt had surgery on her toe Monday 8/19.  Pt denies chest pain, no sob.         Pt is A&OX4, placed on full monitor, EKG done, IV established, changed into gown and given warm blankets. Labs drawn, labeled, and sent to lab. White board updated, and patient is updated on plan of care.      unremarkable. BNP elevated to 272 (no recent priors, but wnl in 2020). Trop 0.008. EKG in sinus tach with RBBB (without significant change from prior). Covid/ RSV/ flu neg. CXR with mild cardiomegaly (stable from prior), lung fields clear. SOB improved with duo-nebs x2.       Overview/Hospital Course:  No notes on file    Interval History: No new symptoms. TTE with newly reduced EF. Discussed at length with patient, questions answered. Planning on starting GDMT. Cards consult tomorrow.     Review of Systems   Constitutional:  Positive for fatigue.   HENT:  Negative for trouble swallowing.    Respiratory:  Positive for cough, shortness of breath and wheezing. Negative for chest tightness.    Cardiovascular:  Positive for palpitations and leg swelling (baseline).        Chest pain/tightness resolved   Gastrointestinal:  Negative for nausea.   Endocrine: Negative for polydipsia.   Musculoskeletal:  Negative for neck pain.   Skin:  Negative for rash and wound.   Neurological:  Negative for syncope and light-headedness.   Psychiatric/Behavioral:  Negative for confusion.    Objective:     Vital Signs (Most Recent):  Temp: 97.7 °F (36.5 °C) (12/22/22 1143)  Pulse: 100 (12/22/22 1518)  Resp: 18 (12/22/22 1518)  BP: (!) 147/105 (12/22/22 1143)  SpO2: (!) 92 % (12/22/22 1143)   Vital Signs (24h Range):  Temp:  [97.7 °F (36.5 °C)-98.4 °F (36.9 °C)] 97.7 °F (36.5 °C)  Pulse:  [] 100  Resp:  [16-20] 18  SpO2:  [92 %-97 %] 92 %  BP: (134-172)/() 147/105     Weight: (!) 150 kg (330 lb 11 oz)  Body mass index is 50.28 kg/m².    Intake/Output Summary (Last 24 hours) at 12/22/2022 1616  Last data filed at 12/21/2022 1740  Gross per 24 hour   Intake 300 ml   Output --   Net 300 ml      Physical Exam  Vitals and nursing note reviewed.   Constitutional:       General: She is not in acute distress.     Appearance: She is well-developed. She is obese. She is not ill-appearing.   HENT:      Head: Normocephalic and atraumatic.    Eyes:      General: No scleral icterus.     Conjunctiva/sclera: Conjunctivae normal.   Cardiovascular:      Rate and Rhythm: Regular rhythm. Tachycardia present.      Comments: No JVD  Pulmonary:      Effort: Pulmonary effort is normal. No respiratory distress.      Breath sounds: Normal breath sounds. No wheezing, rhonchi or rales.      Comments: Breath sounds clear. Breathing comfortably on room air.   Abdominal:      General: Bowel sounds are normal. There is no distension.      Palpations: Abdomen is soft.      Tenderness: There is no abdominal tenderness.   Musculoskeletal:         General: Normal range of motion.      Cervical back: Normal range of motion and neck supple.      Comments: Nonpitting edema of bilateral ankles    Lymphadenopathy:      Cervical: No cervical adenopathy.   Skin:     General: Skin is warm and dry.      Capillary Refill: Capillary refill takes less than 2 seconds.      Findings: No rash.   Neurological:      General: No focal deficit present.      Mental Status: She is alert and oriented to person, place, and time.   Psychiatric:         Behavior: Behavior normal.         Thought Content: Thought content normal.         Judgment: Judgment normal.       Significant Labs: All pertinent labs within the past 24 hours have been reviewed.    Significant Imaging: I have reviewed all pertinent imaging results/findings within the past 24 hours.      Assessment/Plan:      Heart failure with reduced ejection fraction  TTE with newly reduced EF  - Cards consult  - stress PET planned 12/23  - GDMT initiated  - Telemetry   - lasix 40mg BID  - heart failure pathway initiated     TTE 12/22/22   The left ventricle is moderately enlarged with severely decreased systolic function.   Mild left atrial enlargement.   The estimated ejection fraction is 20%.   Left ventricular diastolic dysfunction.   There is abnormal septal wall motion consistent with left bundle branch block.   The quantitatively  derived ejection fraction is 25%.   Mild right ventricular enlargement with normal right ventricular systolic function.   Intermediate central venous pressure (8 mmHg).           Shortness of breath  Patient with exertional SOB, associated with wheezing and chest tightness. Admission to [facility] 1 mo ago for pneumonia and respiratory failure. Referred to pulm clinic, appt next month.   - Today patient has sinus tachycardia, but otherwise stable vitals without hypoxia, AF without leukocytosis.  - , TTE with newly reduced EF    Elevated brain natriuretic peptide (BNP) level  -    - no hx of CHF   - mild cardiomegaly on CXR (stable), without evidence of pulm edema   - not volume overloaded on exam   - TTE ordered      Anxiety  Depression    Patient endorses anxiety about health, which she believes contributes to her tachycardia, hypertension, and chest tightness. No SI.   - managed by PCP  - not currently on anxiolytics   - will add atarax while admitted      Chest pain        Current mild episode of major depressive disorder without prior episode        Obesity, Class III, BMI 40-49.9 (morbid obesity)  Body mass index is 48.94 kg/m². Morbid obesity complicates all aspects of disease management from diagnostic modalities to treatment. Weight loss encouraged and health benefits explained to patient.         Essential hypertension  -  on arrival, now improved  - continue lasix, coreg, entresto        VTE Risk Mitigation (From admission, onward)         Ordered     IP VTE HIGH RISK PATIENT  Once         12/22/22 1542     Place sequential compression device  Until discontinued         12/22/22 1542     Place sequential compression device  Until discontinued         12/21/22 1319                Discharge Planning   HELEN: 12/22/2022     Code Status: Full Code   Is the patient medically ready for discharge?:     Reason for patient still in hospital (select all that apply): Patient trending condition and  Consult recommendations  Discharge Plan A: Home with family                  May MELINA Restrepo MD  Department of Hospital Medicine   [facility] - Observation 11H

## 2024-08-26 NOTE — ED PROVIDER NOTES
FACULTY SIGN-OUT  ADDENDUM       Patient: Krystina Han   MRN: [id number]  PCP:  Dale Trejo MD  Note Started: 8/26/24, 3:40 PM EDT  Attestation  I was available and discussed any additional care issues that arose and coordinated the management plans with the resident(s) caring for the patient during my duty period. Any areas of disagreement with resident's documentation of care or procedures are noted on the chart. I was personally present for the key portions of any/all procedures during my duty period. I have documented in the chart those procedures where I was not present during the key portions.   The patient's initial evaluation and plan have been discussed with the prior provider who initially evaluated the patient.      Pertinent Comments:  The patient is a 68 y.o. female taken in signout with no syncopal event feeling better now  We are awaiting workup and reevaluation    ED COURSE      The patient was given the following medications:  Orders Placed This Encounter   Medications    sodium chloride 0.9 % bolus 1,000 mL       RECENT VITALS:   BP: 110/66  Pulse: 56  Respirations: 15  Temp: 98 °F (36.7 °C) SpO2: 96 %    (Please note that portions of this note were completed with a voice recognition program.  Efforts were made to edit the dictations but occasionally words are mis-transcribed.)    Adla Chavarria MD [facility]  Attending Emergency Medicine Physician       Adal Chavarria MD  08/26/24 [id number]

## 2024-08-26 NOTE — ED NOTES
ED to inpatient nurses report      Chief Complaint:  Chief Complaint   Patient presents with    Loss of Consciousness     Present to ED from: Pt presents to Ed pt was at podiatrist office, pt had blurry vision, pt felt like she was going to pass out, was brought here to Ed from office. Pt states she was just sitting and not doing anything when she felt like she was going to pass out. Pt states she felt like everything was going dark. Pt states she just started Nifedipine about a week ago.   Pt had surgery on her toe Monday 8/19.  Pt denies chest pain, no sob.     MOA:     LOC: alert and orientated to name, place, date  Mobility: Requires assistance * 1  Oxygen Baseline: room air     Current needs required:   Pending ED orders: admit   Present condition: RR non labored and even, family at bedside. Pt resting in bed.      Why did the patient come to the ED? Sycopal/hypotensive  What is the plan? Admit   Any procedures or intervention occur? EKG, labs, xray    Any safety concerns?? Fall risk, has right foot in boot,     Mental Status:       Psych Assessment:      Vital signs   Vitals:    08/26/24 1425 08/26/24 1500 08/26/24 1604 08/26/24 1645   BP: 91/79 110/66 118/70 118/66   Pulse:  56 62 58   Resp:  15 17 14   Temp:       TempSrc:       SpO2:  96% 94% 97%        Vitals:  Patient Vitals for the past 24 hrs:   BP Temp Temp src Pulse Resp SpO2   08/26/24 1645 118/66 -- -- 58 14 97 %   08/26/24 1604 118/70 -- -- 62 17 94 %   08/26/24 1500 110/66 -- -- 56 15 96 %   08/26/24 1425 91/79 -- -- -- -- --   08/26/24 1423 -- 98 °F (36.7 °C) Oral 62 14 96 %      Visit Vitals  /66   Pulse 58   Temp 98 °F (36.7 °C) (Oral)   Resp 14   SpO2 97%        LDAs:   Peripheral IV 08/26/24 Proximal;Right;Anterior Antecubital (Active)   Site Assessment Clean, dry & intact 08/26/24 1427       Ambulatory Status:  Presents to emergency department  because of falls (Syncope, seizure, or loss of consciousness): Yes (Pt felt like she was  MD Kam          Skin Assessment:        Pain Score:         SOCIAL HISTORY       Social History     Socioeconomic History    Marital status:      Spouse name: None    Number of children: None    Years of education: None    Highest education level: None   Tobacco Use    Smoking status: Never    Smokeless tobacco: Never   Vaping Use    Vaping status: Never Used   Substance and Sexual Activity    Alcohol use: Not Currently    Drug use: Not Currently     Social Determinants of Health     Food Insecurity: No Food Insecurity (2/8/2023)    [facility] [employer]    Hunger Screening     Within the past 12 months we worried whether our food would run out before we got money to buy more.: Never True     Within the past 12 months the food we bought just didn't last and we didn't have money to get more.: Never True       FAMILY HISTORY     History reviewed. No pertinent family history.    ALLERGIES     Hydrocodone    CURRENT MEDICATIONS       Previous Medications    CHOLECALCIFEROL (VITAMIN D3) 50 MCG (2000 UT) CAPS    Take by mouth daily    CLONIDINE (CATAPRES) 0.1 MG TABLET    TAKE 1 TABLET BY MOUTH TWICE DAILY FOR 90 DAYS    LOSARTAN (COZAAR) 100 MG TABLET    Take 1 tablet by mouth daily    OXYCODONE-ACETAMINOPHEN (PERCOCET) 5-325 MG PER TABLET    Take 1 tablet by mouth every 6 hours as needed for Pain for up to 7 days. Intended supply: 7 days. Take lowest dose possible to manage pain Max Daily Amount: 4 tablets    OXYCODONE-ACETAMINOPHEN (PERCOCET) 5-325 MG PER TABLET    Take 1 tablet by mouth every 6 hours as needed for Pain for up to 7 days. Intended supply: 7 days. Take lowest dose possible to manage pain Max Daily Amount: 4 tablets    SIMVASTATIN (ZOCOR) 40 MG TABLET    Take 1 tablet by mouth nightly at bedtime.    TRAMADOL (ULTRAM) 50 MG TABLET    TAKE 1/2 TABLET BY MOUTH TWICE A DAY AS NEEDED FOR 30 DAYS    VITAMIN B-12 (CYANOCOBALAMIN) 1000 MCG TABLET    Take 1 tablet  5:38 PM

## 2024-08-27 LAB
EKG ATRIAL RATE: 54 BPM
EKG P AXIS: 54 DEGREES
EKG P-R INTERVAL: 166 MS
EKG Q-T INTERVAL: 428 MS
EKG QRS DURATION: 80 MS
EKG QTC CALCULATION (BAZETT): 405 MS
EKG R AXIS: 0 DEGREES
EKG T AXIS: 40 DEGREES
EKG VENTRICULAR RATE: 54 BPM

## 2024-08-27 PROCEDURE — 93005 ELECTROCARDIOGRAM TRACING: CPT | Performed by: EMERGENCY MEDICINE

## 2024-08-27 PROCEDURE — 99223 1ST HOSP IP/OBS HIGH 75: CPT | Performed by: INTERNAL MEDICINE

## 2024-08-27 PROCEDURE — 6370000000 HC RX 637 (ALT 250 FOR IP)

## 2024-08-27 PROCEDURE — G0378 HOSPITAL OBSERVATION PER HR: HCPCS

## 2024-08-27 PROCEDURE — 2580000003 HC RX 258

## 2024-08-27 RX ORDER — NIFEDIPINE 30 MG
30 TABLET, EXTENDED RELEASE ORAL DAILY
COMMUNITY

## 2024-08-27 RX ORDER — LOSARTAN POTASSIUM 50 MG/1
100 TABLET ORAL DAILY
Status: DISCONTINUED | OUTPATIENT
Start: 2024-08-27 | End: 2024-08-29 | Stop reason: HOSPADM

## 2024-08-27 RX ORDER — NIFEDIPINE 30 MG/1
30 TABLET, EXTENDED RELEASE ORAL DAILY
Status: DISCONTINUED | OUTPATIENT
Start: 2024-08-27 | End: 2024-08-28

## 2024-08-27 RX ADMIN — ATORVASTATIN CALCIUM 40 MG: 80 TABLET, FILM COATED ORAL at 20:50

## 2024-08-27 RX ADMIN — LOSARTAN POTASSIUM 100 MG: 50 TABLET, FILM COATED ORAL at 02:35

## 2024-08-27 RX ADMIN — ACETAMINOPHEN 650 MG: 325 TABLET ORAL at 05:07

## 2024-08-27 RX ADMIN — SODIUM CHLORIDE, PRESERVATIVE FREE 10 ML: 5 INJECTION INTRAVENOUS at 20:50

## 2024-08-27 RX ADMIN — CLONIDINE HYDROCHLORIDE 0.1 MG: 0.1 TABLET ORAL at 05:03

## 2024-08-27 ASSESSMENT — PAIN SCALES - GENERAL
PAINLEVEL_OUTOF10: 6
PAINLEVEL_OUTOF10: 0

## 2024-08-27 ASSESSMENT — PAIN SCALES - WONG BAKER
WONGBAKER_NUMERICALRESPONSE: NO HURT

## 2024-08-27 ASSESSMENT — PAIN DESCRIPTION - DESCRIPTORS: DESCRIPTORS: ACHING

## 2024-08-27 ASSESSMENT — PAIN DESCRIPTION - ORIENTATION: ORIENTATION: RIGHT;LEFT

## 2024-08-27 ASSESSMENT — PAIN DESCRIPTION - LOCATION: LOCATION: SHOULDER

## 2024-08-27 NOTE — ED NOTES
ED to inpatient nurses report      Chief Complaint:  Chief Complaint   Patient presents with    Loss of Consciousness     Present to ED from: Home    MOA:     LOC: alert and orientated to name, place, date  Mobility: 1 assist  Oxygen Baseline: RA    Current needs required: none   Pending ED orders: none  Present condition: stable    Why did the patient come to the ED? Pt presents to Ed pt was at podiatrist office, pt had blurry vision, pt felt like she was going to pass out, was brought here to Ed from office. Pt states she was just sitting and not doing anything when she felt like she was going to pass out. Pt states she felt like everything was going dark. Pt states she just started Nifedipine about a week ago.   Pt had surgery on her toe Monday 8/19.  Pt denies chest pain, no sob.   What is the plan? Admit  Any procedures or intervention occur? EKG, labs, X-ray  Any safety concerns?? Right foor on boot    Mental Status:       Psych Assessment:      Vital signs   Vitals:    08/26/24 1625 08/26/24 1641 08/26/24 1645 08/26/24 1659   BP: 116/65  118/66    Pulse: 58 58 58 58   Resp: 11 12 14 16   Temp:       TempSrc:       SpO2: 98% 98% 97% 97%        Vitals:  Patient Vitals for the past 24 hrs:   BP Temp Temp src Pulse Resp SpO2   08/26/24 1659 -- -- -- 58 16 97 %   08/26/24 1645 118/66 -- -- 58 14 97 %   08/26/24 1641 -- -- -- 58 12 98 %   08/26/24 1625 116/65 -- -- 58 11 98 %   08/26/24 1604 118/70 -- -- 62 17 94 %   08/26/24 1541 -- -- -- 54 13 99 %   08/26/24 1525 118/67 -- -- 65 15 97 %   08/26/24 1500 110/66 -- -- 56 15 96 %   08/26/24 1441 118/67 -- -- 60 12 98 %   08/26/24 1425 91/79 -- -- 64 15 98 %   08/26/24 1423 -- 98 °F (36.7 °C) Oral 62 14 96 %      Visit Vitals  /66   Pulse 58   Temp 98 °F (36.7 °C) (Oral)   Resp 16   SpO2 97%        LDAs:   Peripheral IV 08/26/24 Proximal;Right;Anterior Antecubital (Active)   Site Assessment Clean, dry & intact 08/26/24 1427       Ambulatory Status:  Presents to  Potassium 3.4 (*)     Glucose 119 (*)     Creatinine 1.3 (*)     Est, Glom Filt Rate 43 (*)     All other components within normal limits   TROPONIN   PREVIOUS SPECIMEN   TROPONIN       Electronically signed by Holly Sam RN on 8/26/2024 at 8:28 PM

## 2024-08-27 NOTE — PLAN OF CARE
Problem: Discharge Planning  Goal: Discharge to home or other facility with appropriate resources  8/27/2024 0655 by Shakeel Tam, RN  Outcome: Progressing  8/27/2024 0149 by Shakeel Tam, RN  Outcome: Progressing     Problem: Pain  Goal: Verbalizes/displays adequate comfort level or baseline comfort level  8/27/2024 0655 by Shakeel Tam, RN  Outcome: Progressing  8/27/2024 0149 by Shakeel Tam, RN  Outcome: Progressing     Problem: Safety - Adult  Goal: Free from fall injury  8/27/2024 0655 by Shakeel Tam, RN  Outcome: Progressing  8/27/2024 0149 by Shakeel Tam, RN  Outcome: Progressing

## 2024-08-27 NOTE — ED PROVIDER NOTES
[facility] ED  Emergency Department Encounter  Emergency Medicine Resident     Pt Name:Krystina Han  MRN: [id number]  Birthdate 1955  Date of evaluation: 8/26/24  PCP:  Dale Trejo MD  Note Started: 11:16 PM EDT      CHIEF COMPLAINT       Chief Complaint   Patient presents with    Loss of Consciousness       HISTORY OF PRESENT ILLNESS  (Location/Symptom, Timing/Onset, Context/Setting, Quality, Duration, Modifying Factors, Severity.)      Krystina Han is a 68 y.o. female who presents with a near-syncope episode while waiting for a follow-up appointment for her recent foot surgery. The patient reported that while sitting in a chair in the doctor's office, she experienced a sensation of things getting dark and blurry, and felt like she was going to pass out. She did not lose consciousness. The patient mentioned a similar episode last Thursday, where she completely blacked out and was transferred to [facility] from a restaurant. She woke up in an ambulance. The patient denies any history of heart attacks, strokes, chest pain, or shortness of breath. She is not on any blood thinners. The patient had an echocardiogram in 2019 which showed an ejection fraction of 55-60%, indicating normal heart function. She denies any history of pacemakers or heart surgeries.    The patient had foot surgery last Monday, involving the removal of a toenail. She denies any discharge or pain from the surgical site. She was at the podiatrist's office for a follow-up appointment when the near-syncope episode occurred.      PAST MEDICAL / SURGICAL / SOCIAL / FAMILY HISTORY      has a past medical history of Head injury, Hearing loss, Hypertension, PONV (postoperative nausea and vomiting), Prolonged emergence from general anesthesia, and Shingles.       has a past surgical history that includes Breast biopsy (Left, 2013); Breast lumpectomy (Left, 2013); Cholecystectomy; Hysterectomy; [place] tooth  equal, round, and reactive to light.   Cardiovascular:      Rate and Rhythm: Normal rate and regular rhythm.      Pulses:           Radial pulses are 2+ on the right side and 2+ on the left side.        Dorsalis pedis pulses are 2+ on the right side and 2+ on the left side.      Heart sounds: Normal heart sounds.   Pulmonary:      Effort: Pulmonary effort is normal. No respiratory distress.      Breath sounds: Normal breath sounds.   Abdominal:      Palpations: Abdomen is soft.      Tenderness: There is no abdominal tenderness.   Musculoskeletal:         General: No tenderness. Normal range of motion.   Skin:     General: Skin is warm and dry.      Findings: No rash.   Neurological:      Mental Status: She is alert and oriented to person, place, and time.           DDX/DIAGNOSTIC RESULTS / EMERGENCY DEPARTMENT COURSE / MDM     Medical Decision Making  Amount and/or Complexity of Data Reviewed  Labs: ordered. Decision-making details documented in ED Course.  Radiology: ordered. Decision-making details documented in ED Course.  ECG/medicine tests: ordered.    Risk  OTC drugs.  Prescription drug management.  Decision regarding hospitalization.        ASSESSMENT:  1. Near-syncope episode.  2. Recent foot surgery with no complications reported.    PLAN:  1. Obtain laboratory tests to evaluate potential underlying causes of the near-syncope episode.  2. Review EKG to assess cardiac function.  3. Follow up on the patient's medication list as provided by the patient.    DIFFERENTIAL DIAGNOSIS (DDx):  1. Cardiac arrhythmia  2. Orthostatic hypotension  3. Vasovagal syncope  4. Dehydration  5. Hypoglycemia  6. Anemia  7. Medication side effects  8. Electrolyte imbalance  Will obtain CBC, CMP, EKG, chest x-ray, troponin X2.  Further management pending labs and imaging results    EKG  EKG Interpretation    Interpreted by me    Rhythm: normal sinus   Rate: normal  Axis: normal  Ectopy: none  Conduction: normal  ST Segments: no

## 2024-08-27 NOTE — CARE COORDINATION
DISCHARGE PLANNING EVALUATION: OP/OBSERVATION        8/27/24, 10:19 AM EDT    Krystina Han         Location: OBS 30/30-1   Reason for hospitalization: Syncope and collapse [R55]  Syncope, near [R55]     CM Services requested for transitional needs.     PCP: Dale Trejo MD    Transportation/Food Security/Housing Addressed:  No issues identified.     Equipment needs: none; has a shower seat, walker and crutches at home    Case Management Services Information Letter Provided [x]    Transition plan: plan is to return home independent w/ family and family will transport home

## 2024-08-28 ENCOUNTER — APPOINTMENT (OUTPATIENT)
Age: 69
End: 2024-08-28
Attending: EMERGENCY MEDICINE
Payer: MEDICARE

## 2024-08-28 VITALS
HEART RATE: 73 BPM | TEMPERATURE: 97.3 F | HEIGHT: 66 IN | RESPIRATION RATE: 18 BRPM | WEIGHT: 173 LBS | BODY MASS INDEX: 27.8 KG/M2 | DIASTOLIC BLOOD PRESSURE: 95 MMHG | OXYGEN SATURATION: 98 % | SYSTOLIC BLOOD PRESSURE: 176 MMHG

## 2024-08-28 LAB
ECHO AO ASC DIAM: 2.7 CM
ECHO AO ASCENDING AORTA INDEX: 1.44 CM/M2
ECHO AO ROOT DIAM: 2.6 CM
ECHO AO ROOT INDEX: 1.38 CM/M2
ECHO AV AREA PEAK VELOCITY: 2.2 CM2
ECHO AV AREA VTI: 2.4 CM2
ECHO AV AREA/BSA PEAK VELOCITY: 1.2 CM2/M2
ECHO AV AREA/BSA VTI: 1.3 CM2/M2
ECHO AV MEAN GRADIENT: 4 MMHG
ECHO AV MEAN VELOCITY: 0.9 M/S
ECHO AV PEAK GRADIENT: 8 MMHG
ECHO AV PEAK VELOCITY: 1.4 M/S
ECHO AV VELOCITY RATIO: 0.64
ECHO AV VTI: 28.1 CM
ECHO BSA: 1.91 M2
ECHO LA AREA 2C: 6.5 CM2
ECHO LA AREA 4C: 7.7 CM2
ECHO LA DIAMETER INDEX: 1.38 CM/M2
ECHO LA DIAMETER: 2.6 CM
ECHO LA MAJOR AXIS: 2.9 CM
ECHO LA MINOR AXIS: 2.9 CM
ECHO LA TO AORTIC ROOT RATIO: 1
ECHO LA VOL BP: 14 ML (ref 22–52)
ECHO LA VOL MOD A2C: 12 ML (ref 22–52)
ECHO LA VOL MOD A4C: 16 ML (ref 22–52)
ECHO LA VOL/BSA BIPLANE: 7 ML/M2 (ref 16–34)
ECHO LA VOLUME INDEX MOD A2C: 6 ML/M2 (ref 16–34)
ECHO LA VOLUME INDEX MOD A4C: 9 ML/M2 (ref 16–34)
ECHO LV E' LATERAL VELOCITY: 8 CM/S
ECHO LV E' SEPTAL VELOCITY: 11 CM/S
ECHO LV EDV A2C: 37 ML
ECHO LV EDV A4C: 44 ML
ECHO LV EDV INDEX A4C: 23 ML/M2
ECHO LV EDV NDEX A2C: 20 ML/M2
ECHO LV EJECTION FRACTION A2C: 59 %
ECHO LV EJECTION FRACTION A4C: 50 %
ECHO LV EJECTION FRACTION BIPLANE: 55 % (ref 55–100)
ECHO LV ESV A2C: 15 ML
ECHO LV ESV A4C: 22 ML
ECHO LV ESV INDEX A2C: 8 ML/M2
ECHO LV ESV INDEX A4C: 12 ML/M2
ECHO LV FRACTIONAL SHORTENING: 35 % (ref 28–44)
ECHO LV INTERNAL DIMENSION DIASTOLE INDEX: 2.13 CM/M2
ECHO LV INTERNAL DIMENSION DIASTOLIC: 4 CM (ref 3.9–5.3)
ECHO LV INTERNAL DIMENSION SYSTOLIC INDEX: 1.38 CM/M2
ECHO LV INTERNAL DIMENSION SYSTOLIC: 2.6 CM
ECHO LV IVSD: 1 CM (ref 0.6–0.9)
ECHO LV MASS 2D: 136.2 G (ref 67–162)
ECHO LV MASS INDEX 2D: 72.4 G/M2 (ref 43–95)
ECHO LV POSTERIOR WALL DIASTOLIC: 1.1 CM (ref 0.6–0.9)
ECHO LV RELATIVE WALL THICKNESS RATIO: 0.55
ECHO LVOT AREA: 3.5 CM2
ECHO LVOT AV VTI INDEX: 0.7
ECHO LVOT DIAM: 2.1 CM
ECHO LVOT MEAN GRADIENT: 1 MMHG
ECHO LVOT PEAK GRADIENT: 3 MMHG
ECHO LVOT PEAK VELOCITY: 0.9 M/S
ECHO LVOT STROKE VOLUME INDEX: 36.1 ML/M2
ECHO LVOT SV: 67.9 ML
ECHO LVOT VTI: 19.6 CM
ECHO MV A VELOCITY: 0.98 M/S
ECHO MV AREA VTI: 2.4 CM2
ECHO MV E DECELERATION TIME (DT): 152 MS
ECHO MV E VELOCITY: 0.77 M/S
ECHO MV E/A RATIO: 0.79
ECHO MV E/E' LATERAL: 9.63
ECHO MV E/E' RATIO (AVERAGED): 8.31
ECHO MV E/E' SEPTAL: 7
ECHO MV LVOT VTI INDEX: 1.47
ECHO MV MAX VELOCITY: 1 M/S
ECHO MV MEAN GRADIENT: 1 MMHG
ECHO MV MEAN VELOCITY: 0.5 M/S
ECHO MV PEAK GRADIENT: 4 MMHG
ECHO MV VTI: 28.8 CM
ECHO RV FREE WALL PEAK S': 19 CM/S
ECHO RV TAPSE: 2.4 CM (ref 1.7–?)
EKG ATRIAL RATE: 57 BPM
EKG P AXIS: 63 DEGREES
EKG P-R INTERVAL: 154 MS
EKG Q-T INTERVAL: 408 MS
EKG QRS DURATION: 76 MS
EKG QTC CALCULATION (BAZETT): 397 MS
EKG R AXIS: 20 DEGREES
EKG T AXIS: 43 DEGREES
EKG VENTRICULAR RATE: 57 BPM

## 2024-08-28 PROCEDURE — 2580000003 HC RX 258

## 2024-08-28 PROCEDURE — 93306 TTE W/DOPPLER COMPLETE: CPT | Performed by: INTERNAL MEDICINE

## 2024-08-28 PROCEDURE — 6370000000 HC RX 637 (ALT 250 FOR IP)

## 2024-08-28 PROCEDURE — G0378 HOSPITAL OBSERVATION PER HR: HCPCS

## 2024-08-28 PROCEDURE — 6370000000 HC RX 637 (ALT 250 FOR IP): Performed by: INTERNAL MEDICINE

## 2024-08-28 PROCEDURE — 99233 SBSQ HOSP IP/OBS HIGH 50: CPT | Performed by: NURSE PRACTITIONER

## 2024-08-28 PROCEDURE — 93306 TTE W/DOPPLER COMPLETE: CPT

## 2024-08-28 RX ORDER — NIFEDIPINE 30 MG/1
60 TABLET, EXTENDED RELEASE ORAL DAILY
Status: DISCONTINUED | OUTPATIENT
Start: 2024-08-29 | End: 2024-08-29 | Stop reason: HOSPADM

## 2024-08-28 RX ORDER — NIFEDIPINE 30 MG/1
30 TABLET, EXTENDED RELEASE ORAL DAILY
Qty: 30 TABLET | Refills: 3 | Status: SHIPPED | OUTPATIENT
Start: 2024-08-29

## 2024-08-28 RX ADMIN — SODIUM CHLORIDE, PRESERVATIVE FREE 10 ML: 5 INJECTION INTRAVENOUS at 09:41

## 2024-08-28 RX ADMIN — NIFEDIPINE 30 MG: 30 TABLET, EXTENDED RELEASE ORAL at 09:39

## 2024-08-28 RX ADMIN — LOSARTAN POTASSIUM 100 MG: 50 TABLET, FILM COATED ORAL at 09:39

## 2024-08-28 NOTE — H&P
[facility]  CDU / OBSERVATION ENCOUNTER  Physician NOTE     Pt Name: Krysitna Han  MRN: [id number]  Birthdate 1955  Date of evaluation: 8/27/24  Patient's PCP is :  Dale Trejo MD    CHIEF COMPLAINT       Chief Complaint   Patient presents with    Loss of Consciousness         HISTORY OF PRESENT ILLNESS    Krystina Han is a 68 y.o. female who presents patient has had 2 episodes of near syncope.  Patient had wandered approximately a week ago and then another while sitting in her doctor's office after recent foot surgery.  Patient had been started on new antihypertensive and both these episodes occurred after having started nifedipine.  Patient typically is hypertensive on average running in the 1 70-1 80 range according to the charting.  Patient describes blurriness in tightening of her vision field..  Patient did not have any evidence of seizure activity.  Patient denied any chest pain.  Patient had a last echocardiogram in 2019 which showed an ejection fraction of 55 to 60%.  Patient did not have any chest pain or ongoing symptoms now.  Patient has had a history of foot surgery last Monday with removal of the toenail.  She denies any discharge or pain from surgical site.    Location/Symptom: Syncope  Timing/Onset: In the past week 2 episodes.  Provocation: Likely due to starting a new antihypertensive that occurred just prior to these episodes occurring  Quality: Lightheadedness since near syncope  Radiation: None  Severity: Significant and that they both resulted in near syncope versus syncope but now absent  Timing/Duration: In the past week  Modifying Factors: Likely due to antihypertensive    History was obtained in part through review of the ED chart. When possible, a direct discussion was had with ED nurses, residents, and attendings  REVIEW OF SYSTEMS        General ROS - No fevers, No malaise   Ophthalmic ROS - No discharge, No changes in vision  ENT ROS -  No sore throat,  following  images and reviewed the radiologist interpretations:    XR CHEST (2 VW)    Result Date: 8/26/2024  EXAMINATION: TWO XRAY VIEWS OF THE CHEST 8/26/2024 3:39 pm COMPARISON: None. HISTORY: ORDERING SYSTEM PROVIDED HISTORY: rule out acs. presyncopal episode TECHNOLOGIST PROVIDED HISTORY: rule out acs. presyncopal episode FINDINGS: The lungs are without acute focal process.  There is no effusion or pneumothorax. The cardiomediastinal silhouette is without acute process. The osseous structures are without acute process.     No acute process.       LABS:  I have reviewed and interpreted all available lab results.  Labs Reviewed   CBC WITH AUTO DIFFERENTIAL - Abnormal; Notable for the following components:       Result Value    Hematocrit 35.7 (*)     All other components within normal limits   BASIC METABOLIC PANEL - Abnormal; Notable for the following components:    Potassium 3.4 (*)     Glucose 119 (*)     Creatinine 1.3 (*)     Est, Glom Filt Rate 43 (*)     All other components within normal limits   TROPONIN   PREVIOUS SPECIMEN   TROPONIN         CDU IMPRESSION / PLAN      Krystina Han is a 68 y.o. female who presents with near syncope.  Syncope.    Patient with cardiology evaluation.  Seen by attending cardiologist  Negative ischemic workup  Echocardiogram ordered  Patient with episodes beginning after starting new antihypertensive  Medications adjusted by attending cardiologist  Patient feeling well now.  Wishing to stay for echocardiogram  Discussed etiology with patient and family.  Continue home medications and pain control  Monitor vitals, labs, and imaging  DISPO: pending consults and clinical improvement    CONSULTS:    IP CONSULT TO CARDIOLOGY    PROCEDURES:  Not indicated        PATIENT REFERRED TO:    Narayan Fierro DO  [address] [address]  [phone]    Follow up in 2 week(s)        --  Andi Nation MD   Observation Physician    This dictation was generated by voice

## 2024-08-28 NOTE — PROGRESS NOTES
[facility]  Progress Note                   Date:   8/28/2024  Patient name: Krystina Han  Date of admission:  8/26/2024  2:22 PM  MRN:   [id number]  YOB: 1955  PCP: Dale Trejo MD    Reason for Admission: Syncope and collapse [R55]  Syncope, near [R55]    Subjective:       Clinical Changes /Abnormalities: seen & examined in room. BP elevated this AM, awaiting recheck. Remains SR    Review of Systems    Medications:   Scheduled Meds:   losartan  100 mg Oral Daily    NIFEdipine  30 mg Oral Daily    sodium chloride flush  5-40 mL IntraVENous 2 times per day    atorvastatin  40 mg Oral Nightly     Continuous Infusions:   sodium chloride       CBC:   Recent Labs     08/26/24  1440   WBC 7.9   HGB 12.0        BMP:    Recent Labs     08/26/24  1440      K 3.4*      CO2 26   BUN 18   CREATININE 1.3*   GLUCOSE 119*     Hepatic:No results for input(s): \"AST\", \"ALT\", \"BILITOT\", \"ALKPHOS\" in the last 72 hours.    Invalid input(s): \"ALB\"  Troponin:   Recent Labs     08/26/24  1440 08/26/24  1637   TROPHS 8 9     BNP: No results for input(s): \"BNP\" in the last 72 hours.  Lipids: No results for input(s): \"CHOL\", \"HDL\" in the last 72 hours.    Invalid input(s): \"LDLCALCU\"  INR: No results for input(s): \"INR\" in the last 72 hours.    Objective:   Vitals: BP (!) 191/95   Pulse 68   Temp 97.3 °F (36.3 °C) (Temporal)   Resp 18   Ht 1.676 m (5' 6\")   Wt 78.5 kg (173 lb)   SpO2 98%   BMI 27.92 kg/m²   General appearance: alert and cooperative with exam  HEENT: Head: Normocephalic, no lesions, without obvious abnormality.  Neck:no JVD, trachea midline, no adenopathy  Lungs: Clear to auscultation  Heart: Regular rate and rhythm, s1/s2 auscultated, no murmurs  Abdomen: soft, non-tender, bowel sounds active  Extremities: no edema  Neurologic: not done    CHO:   obtained and reviewed.   (02/27/2019)  · Normally contractile left ventricle.   · There are no significant valve  abnormalities.   · There is no evidence of atrial level shunt.      Stress Test:   not obtained.     Cardiac Angiography:   not obtained.    Assessment / Acute Cardiac Problems:   Syncope/pre-syncopal episodes  HTN  HLP    Patient Active Problem List:     Onychocryptosis     Pain of great toe, right     Hammer toe of right foot     Chronic pain of toe of right foot     Benign neoplasm of skin of right foot     Syncope, near      Plan of Treatment:   Continues to have elevated bp. Will Continue Losartan and increase Nifedipine. Avoid clonidine d/t rebound HTN  Echo pending. Further ischemic work-up pending echo findings.   If blood pressure is improved, and echo is low risk, then no further inpatient workup, should follow up in clinic as OP.     Electronically signed by PIERRE Kovacs CNP on 8/28/2024 at 11:40 AM  [facility]  [phone]

## 2024-08-28 NOTE — DISCHARGE INSTRUCTIONS
Keep your follow up appointments    NO Clonidine,     Your workup from the emergency department did not show any significant pathology but you were admitted to the observation unit for ongoing evaluation.    Your testing included echocardiogram    You saw the following specialists cardiologist    We no longer need to keep you in the hospital but that does not mean you are done being treated  -Take your prescribed medications as directed  -Follow-up with your primary care physician or the specialist designated or both as scheduled    Please return if your condition worsens or there are new symptoms    If there are concerns that have not been addressed while you have been in the hospital please let the discharge nurse know so the physician can be informed -it is easier to fix problems while you are still here    If you have questions after you have left the hospital please call the unit at  and ask for the observation resident on-call

## 2024-08-29 NOTE — PROGRESS NOTES
OBS/CDU   Progress NOTE      Patients PCP is:  Dale Trejo MD        SUBJECTIVE      Patient doing well today.  No symptoms.  Patient feeling better and awaiting echocardiogram for disposition    PHYSICAL EXAM      General: NAD, AO X 3  Heent: EMOI, PERRL  Neck: SUPPLE, NO JVD  Cardiovascular: RRR, S1S2  Pulmonary: CTAB, NO SOB  Abdomen: SOFT, NTTP, ND, +BS  Extremities: +2/4 PULSES DISTAL, NO SWELLING  Neuro / Psych: NO NUMBNESS OR TINGLING, MENTATION AT BASELINE    PERTINENT TEST /EXAMS      I have reviewed all available laboratory results.    MEDICATIONS CURRENT   [START ON 8/29/2024] NIFEdipine (PROCARDIA XL) extended release tablet 60 mg, Daily  losartan (COZAAR) tablet 100 mg, Daily  sodium chloride flush 0.9 % injection 5-40 mL, 2 times per day  sodium chloride flush 0.9 % injection 5-40 mL, PRN  0.9 % sodium chloride infusion, PRN  acetaminophen (TYLENOL) tablet 650 mg, Q4H PRN  ondansetron (ZOFRAN-ODT) disintegrating tablet 4 mg, Q8H PRN   Or  ondansetron (ZOFRAN) injection 4 mg, Q6H PRN  atorvastatin (LIPITOR) tablet 40 mg, Nightly        All medication charted and reviewed.    CONSULTS      IP CONSULT TO CARDIOLOGY    ASSESSMENT/PLAN       Krystina Han is a 68 y.o. female who presents with near syncope event.    Near syncope, likely due to blood pressure medications.  Altered medications per cardiology.  Echocardiogram to determine structural cardiac disease.  Echo negative  Will treat with medication change.  Patient counseled.  Discussed also with pharmacist to make sure patient had understanding of medications when she picked them up.  Patient asymptomatic and ready for discharge  Continue home medications and pain control  Monitor vitals, labs, and imaging  DISPO: pending consults and clinical improvement    --  Andi Nation MD  Observation Physician     This dictation was generated by voice recognition computer software.  Although all attempts are made to edit the dictation for accuracy,  there may be errors in the transcription that are not intended.

## 2024-08-29 NOTE — DISCHARGE SUMMARY
CDU Discharge Summary        Patient:  Krystina Han  YOB: 1955    MRN: [id number]   Acct: [id number]    Primary Care Physician: Dale Trejo MD    Admit date:  8/26/2024  2:22 PM  Discharge date: 8/28/2024    Discharge Diagnoses:     1.)  Patient had syncopal episode with acute onset due to medication side effect.  Treated with change medications.  Patient's symptoms are improved with the plan to follow-up as outpatient    Follow-up:  Call today/tomorrow for a follow up appointment with Dale Trejo MD , or return to the Emergency Room with worsening symptoms    Stressed to patient the importance of following up with primary care doctor for further workup/management of symptoms.  Pt verbalizes understanding and agrees with plan.    Discharge Medication Changes:       Medication List        CHANGE how you take these medications      * NIFEdipine 30 MG extended release tablet  Commonly known as: ADALAT CC  What changed: Another medication with the same name was added. Make sure you understand how and when to take each.     * NIFEdipine 30 MG extended release tablet  Commonly known as: PROCARDIA XL  Take 1 tablet by mouth daily  Start taking on: August 29, 2024  What changed: You were already taking a medication with the same name, and this prescription was added. Make sure you understand how and when to take each.           * This list has 2 medication(s) that are the same as other medications prescribed for you. Read the directions carefully, and ask your doctor or other care provider to review them with you.                CONTINUE taking these medications      simvastatin 40 MG tablet  Commonly known as: ZOCOR     traMADol 50 MG tablet  Commonly known as: ULTRAM     vitamin B-12 1000 MCG tablet  Commonly known as: CYANOCOBALAMIN     Vitamin D3 50 MCG (2000 UT) Caps     vitamin E 400 UNIT capsule     zinc gluconate 50 MG tablet            STOP taking these medications      cloNIDine 0.1 MG  Ref Range    Troponin, High Sensitivity 9 0 - 14 ng/L   EKG 12 Lead   Result Value Ref Range    Ventricular Rate 57 BPM    Atrial Rate 57 BPM    P-R Interval 154 ms    QRS Duration 76 ms    Q-T Interval 408 ms    QTc Calculation (Bazett) 397 ms    P Axis 63 degrees    R Axis 20 degrees    T Axis 43 degrees   EKG 12 Lead   Result Value Ref Range    Ventricular Rate 54 BPM    Atrial Rate 54 BPM    P-R Interval 166 ms    QRS Duration 80 ms    Q-T Interval 428 ms    QTc Calculation (Bazett) 405 ms    P Axis 54 degrees    R Axis 0 degrees    T Axis 40 degrees   Echo (TTE) complete (PRN contrast/bubble/strain/3D)   Result Value Ref Range    LV EDV A2C 37 mL    LV EDV A4C 44 mL    LV ESV A2C 15 mL    LV ESV A4C 22 mL    IVSd 1.0 0.6 - 0.9 cm    LVIDd 4.0 3.9 - 5.3 cm    LVIDs 2.6 cm    LVOT Diameter 2.1 cm    LVOT Mean Gradient 1 mmHg    LVOT VTI 19.6 cm    LVOT Peak Velocity 0.9 m/s    LVOT Peak Gradient 3 mmHg    LVPWd 1.1 0.6 - 0.9 cm    LV E' Lateral Velocity 8 cm/s    LV E' Septal Velocity 11 cm/s    LV Ejection Fraction A2C 59 %    LV Ejection Fraction A4C 50 %    EF BP 55 55 - 100 %    LVOT Area 3.5 cm2    LVOT SV 67.9 ml    LA Minor Axis 2.9 cm    LA Major Axis 2.9 cm    LA Area 2C 6.5 cm2    LA Area 4C 7.7 cm2    LA Volume MOD A2C 12 22 - 52 mL    LA Volume MOD A4C 16 22 - 52 mL    LA Volume BP 14 22 - 52 mL    LA Diameter 2.6 cm    AV Mean Velocity 0.9 m/s    AV Mean Gradient 4 mmHg    AV VTI 28.1 cm    AV Peak Velocity 1.4 m/s    AV Peak Gradient 8 mmHg    AV Area by VTI 2.4 cm2    AV Area by Peak Velocity 2.2 cm2    Aortic Root 2.6 cm    Ascending Aorta 2.7 cm    MV E Wave Deceleration Time 152.0 ms    MV A Velocity 0.98 m/s    MV E Velocity 0.77 m/s    MV Mean Gradient 1 mmHg    MV VTI 28.8 cm    MV Mean Velocity 0.5 m/s    MV Max Velocity 1.0 m/s    MV Peak Gradient 4 mmHg    MV Area by VTI 2.4 cm2    RV Free Wall Peak S' 19 cm/s    TAPSE 2.4 1.7 cm    Body Surface Area 1.91 m2    Fractional Shortening 2D 35  28 - 44 %    LV ESV Index A4C 12 mL/m2    LV EDV Index A4C 23 mL/m2    LV ESV Index A2C 8 mL/m2    LV EDV Index A2C 20 mL/m2    LVIDd Index 2.13 cm/m2    LVIDs Index 1.38 cm/m2    LV RWT Ratio 0.55     LV Mass 2D 136.2 67 - 162 g    LV Mass 2D Index 72.4 43 - 95 g/m2    MV E/A 0.79     E/E' Ratio (Averaged) 8.31     E/E' Lateral 9.63     E/E' Septal 7.00     LA Volume Index BP 7 16 - 34 ml/m2    LVOT Stroke Volume Index 36.1 mL/m2    LA Volume Index MOD A2C 6 16 - 34 ml/m2    LA Volume Index MOD A4C 9 16 - 34 ml/m2    LA Size Index 1.38 cm/m2    LA/AO Root Ratio 1.00     Ao Root Index 1.38 cm/m2    Ascending Aorta Index 1.44 cm/m2    AV Velocity Ratio 0.64     LVOT:AV VTI Index 0.70     NICHOLAS/BSA VTI 1.3 cm2/m2    NICHOLAS/BSA Peak Velocity 1.2 cm2/m2    MV:LVOT VTI Index 1.47      XR CHEST (2 VW)    Result Date: 8/26/2024  EXAMINATION: TWO XRAY VIEWS OF THE CHEST 8/26/2024 3:39 pm COMPARISON: None. HISTORY: ORDERING SYSTEM PROVIDED HISTORY: rule out acs. presyncopal episode TECHNOLOGIST PROVIDED HISTORY: rule out acs. presyncopal episode FINDINGS: The lungs are without acute focal process.  There is no effusion or pneumothorax. The cardiomediastinal silhouette is without acute process. The osseous structures are without acute process.     No acute process.           Physical Exam:    General appearance - NAD, AOx 3    Lungs -CTAB, no R/R/R  Heart - RRR, no M/R/G  Abdomen - Soft, NT/ND  Neurological:  MAEx4, No focal motor deficit, sensory loss  Extremities - Cap refil <2 sec in all ext., no edema  Skin -warm, dry      Hospital Course:  Clinical course has improved, labs and imaging reviewed.     Krystina Han originally presented to the hospital on 8/26/2024  2:22 PM with syncopal episode.  At that time it was determined that She required further observation and cardiology evaluation.  Patient was felt to require an echocardiogram for further evaluation.  Ultimately patient did a story of medication changes that likely

## 2024-09-06 NOTE — PROGRESS NOTES
Patient instructed to remove shoes and socks and instructed to sit in exam chair.  Current PCP is Dale Trejo MD and date of last visit was unknown.   Do you have a follow up visit scheduled?  No  If yes, the date is unknown

## 2024-09-30 ENCOUNTER — OFFICE VISIT (OUTPATIENT)
Dept: PODIATRY | Age: 69
End: 2024-09-30

## 2024-09-30 VITALS
DIASTOLIC BLOOD PRESSURE: 79 MMHG | SYSTOLIC BLOOD PRESSURE: 128 MMHG | WEIGHT: 173 LBS | BODY MASS INDEX: 27.8 KG/M2 | HEART RATE: 80 BPM | HEIGHT: 66 IN

## 2024-09-30 DIAGNOSIS — Z98.890 POST-OPERATIVE STATE: Primary | ICD-10-CM

## 2024-09-30 DIAGNOSIS — L20.9 XEROSIS DUE TO ATOPIC DERMATITIS: ICD-10-CM

## 2024-09-30 DIAGNOSIS — G89.29 CHRONIC TOE PAIN, RIGHT FOOT: ICD-10-CM

## 2024-09-30 DIAGNOSIS — M79.674 CHRONIC TOE PAIN, RIGHT FOOT: ICD-10-CM

## 2024-09-30 DIAGNOSIS — M20.41 HAMMER TOE OF RIGHT FOOT: ICD-10-CM

## 2024-09-30 PROCEDURE — 99024 POSTOP FOLLOW-UP VISIT: CPT

## 2024-09-30 RX ORDER — BENZOCAINE/MENTHOL 6 MG-10 MG
LOZENGE MUCOUS MEMBRANE
Qty: 30 G | Refills: 1 | Status: SHIPPED | OUTPATIENT
Start: 2024-09-30 | End: 2024-10-07

## 2024-09-30 RX ORDER — MINERAL OIL/HYDROPHIL PETROLAT
OINTMENT (GRAM) TOPICAL
Qty: 396 G | Refills: 2 | Status: SHIPPED | OUTPATIENT
Start: 2024-09-30

## 2024-10-03 NOTE — PROGRESS NOTES
[facility]  [address]   [address] [place] [address]  Tel: [phone]   Fax: [phone]    Subjective     CC: Right foot pain    Interval history:  Patient was seen in clinic.  Since last appointment patient has changed blood pressure medications and reports that she has not had fainting spells since.  Patient is status post hammer toe correction on 8/19.  She has dressing intact to the lower extremity and k wire still in place.    HPI:  Krystina Han is a 68 y.o. year old female who presents to clinic today with right foot pain.  Patient was previously scheduled to undergo correction of hammertoe on right foot with Dr. Lund before was canceled.  Patient since was referred over here for treatment.  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  Patient has pain with ambulation, states that she has tried inserts and shoes without success.  Patient denies any current nausea, vomiting, fever, chills, shortness of breath.  Patient states that she does not smoke, has no prior significant medical history.  Additionally patient has tried Voltaren gel without success.  Primary care physician is Dale Trejo MD.    ROS:    Constitutional: Denies nausea, vomiting, fever, chills.  Neurologic: Denies numbness, tingling, and burning in the feet.    Vascular: Denies symptoms of lower extremity claudication.    Skin: Denies open wounds.  Otherwise negative except as noted in the HPI.     PMH:  Past Medical History:   Diagnosis Date    Head injury 2017    fell in bathroom    Hearing loss     left    Hypertension     PONV (postoperative nausea and vomiting)     Prolonged emergence from general anesthesia     Shingles 09/03/2021       Surgical History:   Past Surgical History:   Procedure Laterality Date    BREAST BIOPSY Left 2013    atypical ductal hyperplasia    BREAST LUMPECTOMY Left 2013    CHOLECYSTECTOMY

## 2024-10-14 ENCOUNTER — HOSPITAL ENCOUNTER (OUTPATIENT)
Dept: GENERAL RADIOLOGY | Age: 69
Discharge: HOME | End: 2024-10-16
Payer: MEDICARE

## 2024-10-14 ENCOUNTER — OFFICE VISIT (OUTPATIENT)
Dept: PODIATRY | Age: 69
End: 2024-10-14

## 2024-10-14 ENCOUNTER — HOSPITAL ENCOUNTER (OUTPATIENT)
Age: 69
Discharge: HOME | End: 2024-10-16
Payer: MEDICARE

## 2024-10-14 VITALS
WEIGHT: 173 LBS | HEIGHT: 65 IN | HEART RATE: 76 BPM | DIASTOLIC BLOOD PRESSURE: 84 MMHG | BODY MASS INDEX: 28.82 KG/M2 | SYSTOLIC BLOOD PRESSURE: 130 MMHG

## 2024-10-14 DIAGNOSIS — M79.674 CHRONIC TOE PAIN, RIGHT FOOT: ICD-10-CM

## 2024-10-14 DIAGNOSIS — Z98.890 POST-OPERATIVE STATE: Primary | ICD-10-CM

## 2024-10-14 DIAGNOSIS — G89.29 CHRONIC TOE PAIN, RIGHT FOOT: ICD-10-CM

## 2024-10-14 DIAGNOSIS — Z98.890 POST-OPERATIVE STATE: ICD-10-CM

## 2024-10-14 DIAGNOSIS — M20.41 HAMMER TOE OF RIGHT FOOT: ICD-10-CM

## 2024-10-14 PROCEDURE — 99024 POSTOP FOLLOW-UP VISIT: CPT

## 2024-10-14 PROCEDURE — 73630 X-RAY EXAM OF FOOT: CPT

## 2024-10-14 NOTE — PLAN OF CARE
Patient a&ox4. Patient ambulates independently. Patient states she is partial weight bearing on right foot. Ace wrap, bandage, and surgical boot in place. Patient up to bedside commode for safety. Patient uses call light appropriately, call light within reach. Patient has no c/o pain. Patient NPO after midnight. Plan of care echo in AM.     Problem: Discharge Planning  Goal: Discharge to home or other facility with appropriate resources  Outcome: Progressing  Flowsheets (Taken 8/27/2024 2000)  Discharge to home or other facility with appropriate resources:   Identify barriers to discharge with patient and caregiver   Identify discharge learning needs (meds, wound care, etc)     Problem: Pain  Goal: Verbalizes/displays adequate comfort level or baseline comfort level  Outcome: Progressing  Flowsheets (Taken 8/27/2024 2122)  Verbalizes/displays adequate comfort level or baseline comfort level:   Encourage patient to monitor pain and request assistance   Administer analgesics based on type and severity of pain and evaluate response   Assess pain using appropriate pain scale   Implement non-pharmacological measures as appropriate and evaluate response   Consider cultural and social influences on pain and pain management   Notify Licensed Independent Practitioner if interventions unsuccessful or patient reports new pain     Problem: Safety - Adult  Goal: Free from fall injury  Outcome: Progressing  Flowsheets (Taken 8/28/2024 0316)  Free From Fall Injury: Instruct family/caregiver on patient safety     Problem: Chronic Conditions and Co-morbidities  Goal: Patient's chronic conditions and co-morbidity symptoms are monitored and maintained or improved  Outcome: Progressing  Flowsheets (Taken 8/28/2024 0316)  Care Plan - Patient's Chronic Conditions and Co-Morbidity Symptoms are Monitored and Maintained or Improved:   Monitor and assess patient's chronic conditions and comorbid symptoms for stability, deterioration, or  No.

## 2024-10-21 NOTE — PROGRESS NOTES
[facility]  [address].   [address] [place] [address]  Tel: [phone]   Fax: [phone]    Subjective     CC: Right foot pain    Interval history:  About 6 weeks S/P hammertoe correction. She did her her xrays. She is doing well. Patient is status post hammer toe correction on 8/19.  She has dressing intact to the lower extremity and k wire still in place.    HPI:  Krystina Han is a 68 y.o. year old female who presents to clinic today with right foot pain.  Patient was previously scheduled to undergo correction of hammertoe on right foot with Dr. Lund before was canceled.  Patient since was referred over here for treatment.  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  Patient has pain with ambulation, states that she has tried inserts and shoes without success.  Patient denies any current nausea, vomiting, fever, chills, shortness of breath.  Patient states that she does not smoke, has no prior significant medical history.  Additionally patient has tried Voltaren gel without success.  Primary care physician is Dale Trejo MD.    ROS:    Constitutional: Denies nausea, vomiting, fever, chills.  Neurologic: Denies numbness, tingling, and burning in the feet.    Vascular: Denies symptoms of lower extremity claudication.    Skin: Denies open wounds.  Otherwise negative except as noted in the HPI.     PMH:  Past Medical History:   Diagnosis Date    Head injury 2017    fell in bathroom    Hearing loss     left    Hypertension     PONV (postoperative nausea and vomiting)     Prolonged emergence from general anesthesia     Shingles 09/03/2021       Surgical History:   Past Surgical History:   Procedure Laterality Date    BREAST BIOPSY Left 2013    atypical ductal hyperplasia    BREAST LUMPECTOMY Left 2013    CHOLECYSTECTOMY      COLONOSCOPY      EYE SURGERY Bilateral 05/25/2021    cataracts    FOOT

## 2025-03-06 NOTE — PROGRESS NOTES
Patient instructed to remove shoes and socks and instructed to sit in exam chair.  Current PCP is Dale Trejo MD and date of last visit was [id number].   Do you have a follow up visit scheduled?  No  If yes, the date is unknown

## 2025-03-10 ENCOUNTER — HOSPITAL ENCOUNTER (OUTPATIENT)
Age: 70
Discharge: HOME | End: 2025-03-12
Payer: MEDICARE

## 2025-03-10 ENCOUNTER — HOSPITAL ENCOUNTER (OUTPATIENT)
Dept: GENERAL RADIOLOGY | Age: 70
Discharge: HOME | End: 2025-03-12
Payer: MEDICARE

## 2025-03-10 ENCOUNTER — OFFICE VISIT (OUTPATIENT)
Dept: PODIATRY | Age: 70
End: 2025-03-10
Payer: MEDICARE

## 2025-03-10 VITALS
HEIGHT: 66 IN | BODY MASS INDEX: 27.8 KG/M2 | SYSTOLIC BLOOD PRESSURE: 134 MMHG | WEIGHT: 173 LBS | HEART RATE: 96 BPM | DIASTOLIC BLOOD PRESSURE: 74 MMHG

## 2025-03-10 DIAGNOSIS — M79.671 RIGHT FOOT PAIN: ICD-10-CM

## 2025-03-10 DIAGNOSIS — Z98.890 POST-OPERATIVE STATE: ICD-10-CM

## 2025-03-10 DIAGNOSIS — M77.8 CAPSULITIS OF FOOT, RIGHT: ICD-10-CM

## 2025-03-10 DIAGNOSIS — M79.671 RIGHT FOOT PAIN: Primary | ICD-10-CM

## 2025-03-10 PROCEDURE — 99213 OFFICE O/P EST LOW 20 MIN: CPT

## 2025-03-10 PROCEDURE — 73630 X-RAY EXAM OF FOOT: CPT

## 2025-03-10 RX ORDER — MELOXICAM 7.5 MG/1
7.5 TABLET ORAL DAILY
Qty: 90 TABLET | Refills: 3 | Status: SHIPPED | OUTPATIENT
Start: 2025-03-10

## 2025-03-10 NOTE — PROGRESS NOTES
[facility]  [address].   [address] [place] [address]  Tel: [phone]   Fax: [phone]    Subjective     CC: Right foot pain    Interval history:  Patient returns to clinic today for follow-up of right foot second digit hammertoe correction (DOS 8/19/2024).  Patient has transitioned to weightbearing as tolerated in normal shoe gear.  Patient reports she has continued pain to the second metatarsophalangeal joint with prolonged standing and at night.  Patient states that the pain feels improved versus preoperatively, but states that she has had some persistent pain since surgery.  Patient reports that she has been applying Biofreeze and taking Tylenol as needed with mild relief of symptoms.  Patient denies any other complaints at this time.      HPI:  Krystina Han is a 69 y.o. year old female who presents to clinic today with right foot pain.  Patient was previously scheduled to undergo correction of hammertoe on right foot with Dr. Lund before was canceled.  Patient since was referred over here for treatment.  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  Patient has pain with ambulation, states that she has tried inserts and shoes without success.  Patient denies any current nausea, vomiting, fever, chills, shortness of breath.  Patient states that she does not smoke, has no prior significant medical history.  Additionally patient has tried Voltaren gel without success.  Primary care physician is Dale Trejo MD.    ROS:    Constitutional: Denies nausea, vomiting, fever, chills.  Neurologic: Denies numbness, tingling, and burning in the feet.    Vascular: Denies symptoms of lower extremity claudication.    Skin: Denies open wounds.  Otherwise negative except as noted in the HPI.     PMH:  Past Medical History:   Diagnosis Date    Head injury 2017    fell in bathroom    Hearing loss     left

## 2025-05-12 ENCOUNTER — OFFICE VISIT (OUTPATIENT)
Age: 70
End: 2025-05-12

## 2025-05-12 VITALS
BODY MASS INDEX: 29.41 KG/M2 | HEIGHT: 66 IN | DIASTOLIC BLOOD PRESSURE: 85 MMHG | WEIGHT: 183 LBS | SYSTOLIC BLOOD PRESSURE: 148 MMHG | HEART RATE: 79 BPM

## 2025-05-12 DIAGNOSIS — M79.671 RIGHT FOOT PAIN: Primary | ICD-10-CM

## 2025-05-12 NOTE — PROGRESS NOTES
[facility]  [address].   [address] [place] [address]  Tel: [phone]   Fax: [phone]    Subjective     CC: Right foot pain    Interval history:  Patient presents for scheduled follow-up visit.  She had hammertoe correction on 8/19/2024.  She is thrilled with her results and states she has no issues at this time.  She does have some cramping in the back of her right leg.  We discussed etiologies for this including PVD, dehydration, blood clots, etc.  Patient does not follow in these categories at this time.  Pain in her calf is only at night once in a while intermittently.  It is not severe and is not present currently.      HPI:  Krystina Han is a 69 y.o. year old female who presents to clinic today with right foot pain.  Patient was previously scheduled to undergo correction of hammertoe on right foot with Dr. Lund before was canceled.  Patient since was referred over here for treatment.  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  Patient has pain with ambulation, states that she has tried inserts and shoes without success.  Patient denies any current nausea, vomiting, fever, chills, shortness of breath.  Patient states that she does not smoke, has no prior significant medical history.  Additionally patient has tried Voltaren gel without success.  Primary care physician is Dale Trejo MD.    ROS:    Constitutional: Denies nausea, vomiting, fever, chills.  Neurologic: Denies numbness, tingling, and burning in the feet.    Vascular: Denies symptoms of lower extremity claudication.    Skin: Denies open wounds.  Otherwise negative except as noted in the HPI.     PMH:  Past Medical History:   Diagnosis Date    Head injury 2017    fell in bathroom    Hearing loss     left    Hypertension     PONV (postoperative nausea and vomiting)     Prolonged emergence from general anesthesia     Shingles

## 2025-05-12 NOTE — PROGRESS NOTES
Patient instructed to remove shoes and socks and instructed to sit in exam chair.  Current PCP is Dale Trejo MD and date of last visit was unknown.   Do you have a follow up visit scheduled?  unknown  If yes, the date is unknown

## (undated) DEVICE — BLADE SAW W5.5XL18MM THK0.38MM CUT THK0.43MM REPL S STL SAG

## (undated) DEVICE — ST CHARLES PODIATRY: Brand: MEDLINE INDUSTRIES, INC.

## (undated) DEVICE — BUR SURG RND MIC 4X4 MM 54.5 MM 8 FLUT J NOTCH SS

## (undated) DEVICE — ELECTRODE ES L3IN S STL BLDE INSUL DISP VALLEYLAB EDGE

## (undated) DEVICE — SUTURE MONOCRYL SZ 3-0 L27IN ABSRB UD PS-2 3/8 CIR REV CUT NDL MCP427H

## (undated) DEVICE — BLADE,CARBON-STEEL,15,STRL,DISPOSABLE,TB: Brand: MEDLINE

## (undated) DEVICE — DRESSING PETRO W3XL3IN OIL EMUL N ADH GZ KNIT IMPREG CELOS

## (undated) DEVICE — SUTURE PROL SZ 3-0 L18IN NONABSORBABLE BLU L24MM FS-1 3/8 8684G

## (undated) DEVICE — COVER,TABLE,60X90,STERILE: Brand: MEDLINE

## (undated) DEVICE — C-ARMOR C-ARM EQUIPMENT COVERS CLEAR STERILE UNIVERSAL FIT 12 PER CASE: Brand: C-ARMOR

## (undated) DEVICE — GLOVE ORANGE PI 8 1/2   MSG9085

## (undated) DEVICE — SUTURE PDS + SZ 2 0 L27IN ABSRB VLT L36MM CT 1 1 2 CIR PDP339H

## (undated) DEVICE — SINGLE PORT MANIFOLD: Brand: NEPTUNE 2

## (undated) DEVICE — C-ARM: Brand: UNBRANDED

## (undated) DEVICE — SPONGE LAP W18XL18IN WHT COT 4 PLY FLD STRUNG RADPQ DISP ST 2 PER PACK

## (undated) DEVICE — BLANKET WRM W29.9XL79.1IN UP BODY FORC AIR MISTRAL-AIR